# Patient Record
Sex: MALE | Race: WHITE | NOT HISPANIC OR LATINO | Employment: OTHER | URBAN - METROPOLITAN AREA
[De-identification: names, ages, dates, MRNs, and addresses within clinical notes are randomized per-mention and may not be internally consistent; named-entity substitution may affect disease eponyms.]

---

## 2018-12-13 LAB — HBA1C MFR BLD HPLC: 5.6 %

## 2018-12-21 DIAGNOSIS — I10 HYPERTENSION, UNSPECIFIED TYPE: Primary | ICD-10-CM

## 2018-12-21 RX ORDER — CARVEDILOL 12.5 MG/1
12.5 TABLET ORAL 2 TIMES DAILY WITH MEALS
Qty: 30 TABLET | Refills: 5 | Status: SHIPPED | OUTPATIENT
Start: 2018-12-21

## 2018-12-21 RX ORDER — CARVEDILOL 12.5 MG/1
TABLET ORAL EVERY 12 HOURS
COMMUNITY
Start: 2018-05-29 | End: 2018-12-21 | Stop reason: SDUPTHER

## 2019-03-04 ENCOUNTER — TELEPHONE (OUTPATIENT)
Dept: CARDIOLOGY CLINIC | Facility: CLINIC | Age: 59
End: 2019-03-04

## 2019-03-04 DIAGNOSIS — R00.2 PALPITATIONS: Primary | ICD-10-CM

## 2019-03-04 NOTE — TELEPHONE ENCOUNTER
Pt called was seen in ED in [address] on Saturday   states had palpitations was examined and released   Was told having PVC which were no dangerous   has continued to experience these all day yesterday and today  Pt denies any SOB or Chest pain  Please advise  Per Dr Marquez Montgomery order a 24 hour holter monitor  Pt would like done at [facility]   [phone]    Order faxed to [phone]

## 2019-07-11 ENCOUNTER — OFFICE VISIT (OUTPATIENT)
Dept: CARDIOLOGY CLINIC | Facility: CLINIC | Age: 59
End: 2019-07-11
Payer: MEDICARE

## 2019-07-11 VITALS
SYSTOLIC BLOOD PRESSURE: 134 MMHG | DIASTOLIC BLOOD PRESSURE: 80 MMHG | HEIGHT: 69 IN | BODY MASS INDEX: 38.39 KG/M2 | WEIGHT: 259.2 LBS

## 2019-07-11 DIAGNOSIS — I25.10 CORONARY ARTERY DISEASE INVOLVING NATIVE CORONARY ARTERY OF NATIVE HEART WITHOUT ANGINA PECTORIS: ICD-10-CM

## 2019-07-11 DIAGNOSIS — I49.3 PVC'S (PREMATURE VENTRICULAR CONTRACTIONS): ICD-10-CM

## 2019-07-11 DIAGNOSIS — I10 ESSENTIAL HYPERTENSION: ICD-10-CM

## 2019-07-11 DIAGNOSIS — Z99.89 OBSTRUCTIVE SLEEP APNEA ON CPAP: ICD-10-CM

## 2019-07-11 DIAGNOSIS — I49.3 PVC (PREMATURE VENTRICULAR CONTRACTION): ICD-10-CM

## 2019-07-11 DIAGNOSIS — Z01.810 PREOP CARDIOVASCULAR EXAM: Primary | ICD-10-CM

## 2019-07-11 DIAGNOSIS — R07.89 OTHER CHEST PAIN: ICD-10-CM

## 2019-07-11 DIAGNOSIS — G47.33 OBSTRUCTIVE SLEEP APNEA ON CPAP: ICD-10-CM

## 2019-07-11 DIAGNOSIS — R00.2 PALPITATIONS: ICD-10-CM

## 2019-07-11 DIAGNOSIS — Z95.5 HISTORY OF HEART ARTERY STENT: ICD-10-CM

## 2019-07-11 DIAGNOSIS — E78.2 MIXED HYPERLIPIDEMIA: ICD-10-CM

## 2019-07-11 DIAGNOSIS — I82.5Z9 CHRONIC DEEP VEIN THROMBOSIS (DVT) OF DISTAL VEIN OF LOWER EXTREMITY, UNSPECIFIED LATERALITY (HCC): ICD-10-CM

## 2019-07-11 PROBLEM — Z86.718 HISTORY OF DVT (DEEP VEIN THROMBOSIS): Status: ACTIVE | Noted: 2019-07-11

## 2019-07-11 PROBLEM — E78.5 HYPERLIPIDEMIA: Status: ACTIVE | Noted: 2019-07-11

## 2019-07-11 PROCEDURE — 93000 ELECTROCARDIOGRAM COMPLETE: CPT | Performed by: INTERNAL MEDICINE

## 2019-07-11 PROCEDURE — 99215 OFFICE O/P EST HI 40 MIN: CPT | Performed by: INTERNAL MEDICINE

## 2019-07-11 RX ORDER — KETOCONAZOLE 20 MG/G
CREAM TOPICAL
Refills: 3 | COMMUNITY
Start: 2019-05-24 | End: 2021-04-14 | Stop reason: ALTCHOICE

## 2019-07-11 RX ORDER — ALPRAZOLAM 0.25 MG/1
TABLET ORAL
Refills: 5 | COMMUNITY
Start: 2019-05-07

## 2019-07-11 RX ORDER — PANTOPRAZOLE SODIUM 40 MG/1
40 TABLET, DELAYED RELEASE ORAL DAILY
Refills: 5 | COMMUNITY
Start: 2019-05-07

## 2019-07-11 RX ORDER — OXYCODONE HYDROCHLORIDE 5 MG/1
5 TABLET ORAL EVERY 4 HOURS PRN
COMMUNITY

## 2019-07-11 RX ORDER — CHOLECALCIFEROL TAB 125 MCG (5000 UNIT) 125 MCG (5000 UT)
1 TAB DAILY
Refills: 5 | COMMUNITY
Start: 2019-05-28

## 2019-07-11 NOTE — PROGRESS NOTES
Cardiology Follow Up    Migue Falisa  1960  [id number]  [address] [place] [id number] [phone]    1  Preop cardiovascular exam  POCT ECG   2  Coronary artery disease involving native coronary artery of native heart without angina pectoris  NM myocardial perfusion spect (rx stress and/or rest)    Echo complete with contrast if indicated   3  Kissing stent the LAD the large diagonal bifurcation performed in 2009      4  Atypical chest pain  NM myocardial perfusion spect (rx stress and/or rest)    Echo complete with contrast if indicated   5  Chronic deep vein thrombosis (DVT) of distal vein of lower extremity, unspecified laterality (HCC)     6  Obstructive sleep apnea on CPAP     7  Essential hypertension     8  Mixed hyperlipidemia     9  Palpitations     10  PVC (premature ventricular contraction)  NM myocardial perfusion spect (rx stress and/or rest)    Echo complete with contrast if indicated   11  PVC's (premature ventricular contractions)         Patient has a history of stenting of the LAD and a large diagonal branch using bifurcation stents with crush technique  Following placement of the stents, it was unable to rewire the diagonal to dilate the or for this of the diagonal branch  Due to complaints of chest discomfort, in 2010 another attempt was made unsuccessfully to dilate the origin of the diagonal branch  Patient has had chest discomfort off and on over the years although most recently it has been mild and infrequent  He also has an esophageal stricture and esophagitis which is confusing with his chest pain  He has undergone periodic esophageal dilatations  Other diagnoses include hypertension, gastritis, hiatal hernia, lung nodule, DVT for which he had been on Coumadin but it was stopped at the time of his visit on May 1, 2018  Hyperlipidemia and sleep apnea        Interval History:  Presently he has rare chest discomfort  He denies shortness of breath but has developed palpitations for which she went to the ER and was noted to have frequent premature ventricular contractions  His weight is up 9 lb from a year ago  His labs including a lipid profile are performed by his family physician  However, we have no results  Patient is scheduled for an endoscopy on July 15, 2019  He needs clearance to undergo the endoscopy          Patient Active Problem List   Diagnosis    Coronary artery disease    Kissing stent the LAD the large diagonal bifurcation performed in 2009     Atypical chest pain    Essential hypertension    Chronic deep vein thrombosis (DVT) of distal vein of lower extremity (HCC)    Obstructive sleep apnea on CPAP    Hyperlipidemia    Palpitations    PVC's (premature ventricular contractions)     Past Medical History:   Diagnosis Date    Coronary artery disease     Hiatal hernia     Hyperlipidemia     Lung nodule     Obstructive sleep apnea on CPAP     SLEEP APNEA WITH CPAP    Palpitations      Social History     Socioeconomic History    Marital status: /Civil Union     Spouse name: Not on file    Number of children: Not on file    Years of education: Not on file    Highest education level: Not on file   Occupational History    Not on file   Social Needs    Financial resource strain: Not on file    Food insecurity:     Worry: Not on file     Inability: Not on file    Transportation needs:     Medical: Not on file     Non-medical: Not on file   Tobacco Use    Smoking status: Current Every Day Smoker     Packs/day: 2 00     Types: Cigarettes    Tobacco comment: TOBACCO USE   Substance and Sexual Activity    Alcohol use: Not on file    Drug use: Not on file    Sexual activity: Not on file   Lifestyle    Physical activity:     Days per week: Not on file     Minutes per session: Not on file    Stress: Not on file   Relationships    Social connections:     Talks on phone: Not on file     Gets together: Not on file     Attends Yazidism service: Not on file     Active member of club or organization: Not on file     Attends meetings of clubs or organizations: Not on file     Relationship status: Not on file    Intimate partner violence:     Fear of current or ex partner: Not on file     Emotionally abused: Not on file     Physically abused: Not on file     Forced sexual activity: Not on file   Other Topics Concern    Not on file   Social History Narrative    Not on file      Family History   Problem Relation Age of Onset    Heart failure Mother         COD    Hypertension Mother         COD    Diabetes Mother         DM; COD    Heart failure Father         COD    Coronary artery disease Father         COD    Hypertension Father         COD    Heart attack Father         MI'S; COD    Hypertension Brother      Past Surgical History:   Procedure Laterality Date    BACK SURGERY  2007    CHOLECYSTECTOMY  1999    CORONARY ANGIOPLASTY  2010    FAILED PTCA OF THE DIAGONAL BRANCH    ESOPHAGEAL DILATION      X4    INSERTION STENT ARTERY  2010    XIENCE STENT OF THE LAD & DIAGONAL    LITHOTRIPSY      NISSEN FUNDOPLICATION  0775    SINUS SURGERY         Current Outpatient Medications:     albuterol (VENTOLIN HFA) 90 mcg/act inhaler, Ventolin HFA 90 mcg/actuation aerosol inhaler  INHALE 2 PUFFS EVERY 4 HOURS AS NEEDED, Disp: , Rfl:     ALPRAZolam (XANAX) 0 25 mg tablet, TAKE ONE TABLET BY MOUTH TWICE A DAY AS NEEDED FOR ANXIETY, Disp: , Rfl: 5    aspirin (ECOTRIN LOW STRENGTH) 81 mg EC tablet, Take 81 mg by mouth daily, Disp: , Rfl:     carvedilol (COREG) 12 5 mg tablet, Take 1 tablet (12 5 mg total) by mouth 2 (two) times a day with meals, Disp: 30 tablet, Rfl: 5    CLONIDINE HCL PO, Take by mouth, Disp: , Rfl:     clopidogrel (PLAVIX) 75 mg tablet, clopidogrel 75 mg tablet  TAKE ONE TABLET BY MOUTH EVERY DAY, Disp: , Rfl:     folic acid (FOLVITE) 1 mg tablet, every 24 hours, Disp: , Rfl:     furosemide (LASIX) 40 mg tablet, furosemide 40 mg tablet  TAKE 1 & 1/2 TABLETS 1 TIME PER DAY, Disp: , Rfl:     isosorbide mononitrate (IMDUR) 30 mg 24 hr tablet, every 24 hours, Disp: , Rfl:     ketoconazole (NIZORAL) 2 % cream, APPLY TO FACIAL RASH ONCE DAILY, Disp: , Rfl: 3    magnesium chloride-calcium (MAG-SR PLUS CALCIUM)  mg, one pill twice a day, Disp: , Rfl:     NATURAL VITAMIN D-3 5000 units TABS, Take 1 tablet by mouth daily, Disp: , Rfl: 5    oxyCODONE (ROXICODONE) 5 mg immediate release tablet, Take 5 mg by mouth every 4 (four) hours as needed for moderate pain, Disp: , Rfl:     pantoprazole (PROTONIX) 40 mg tablet, Take 40 mg by mouth daily, Disp: , Rfl: 5    potassium chloride (MICRO-K) 10 MEQ CR capsule, potassium chloride ER 10 mEq tablet,extended release  TAKE ONE TABLET BY MOUTH THREE TIMES A DAY, Disp: , Rfl:     ranolazine (RANEXA) 1000 MG SR tablet, Every 12 hours, Disp: , Rfl:     rosuvastatin (CRESTOR) 40 MG tablet, every 24 hours, Disp: , Rfl:     sertraline (ZOLOFT) 50 mg tablet, Take 50 mg by mouth daily, Disp: , Rfl: 5    gabapentin (NEURONTIN) 600 MG tablet, gabapentin 600 mg tablet  TAKE ONE TABLET BY MOUTH THREE TIMES A DAY, Disp: , Rfl:     meclizine (ANTIVERT) 25 mg tablet, Take by mouth, Disp: , Rfl:   Allergies   Allergen Reactions    Quinolones Abdominal Pain     Other reaction(s): Other (See Comments), Stomach Pain  abd pain  Abdominal pain  Abdominal pain         Results for orders placed or performed in visit on 07/11/19   POCT ECG    Narrative    Normal sinus rhythm at a rate 85 beats per minute  RSR prime in V1  Nonspecific T-wave abnormality  QT will prolongation  Abnormal EKG  Review of Systems:  Review of Systems   Constitutional: Negative for activity change  Respiratory: Negative for cough, chest tightness, shortness of breath and wheezing      Cardiovascular: Negative for chest pain, palpitations and leg swelling  Musculoskeletal: Negative for gait problem  Skin: Negative for color change  Neurological: Negative for dizziness, tremors, syncope, weakness, light-headedness and headaches  Psychiatric/Behavioral: Negative for agitation and confusion  Physical Exam:  /80 (BP Location: Right arm, Patient Position: Sitting, Cuff Size: Large)   Ht 5' 9" (1 753 m)   Wt 118 kg (259 lb 3 2 oz)   BMI 38 28 kg/m²    Physical Exam   Constitutional: He is oriented to person, place, and time  He appears well-developed and well-nourished  Obese, BMI 38 3   HENT:   Head: Normocephalic and atraumatic  Neck: Normal range of motion  Neck supple  No JVD present  No tracheal deviation present  Cardiovascular: Normal rate, regular rhythm, normal heart sounds and intact distal pulses  Pulmonary/Chest: Effort normal and breath sounds normal  No respiratory distress  He has no wheezes  He has no rales  Abdominal: Soft  Bowel sounds are normal    Musculoskeletal: He exhibits no edema  Neurological: He is alert and oriented to person, place, and time  Skin: Skin is warm and dry  Psychiatric: He has a normal mood and affect  His behavior is normal        Discussion/Summary:  1  With recent development of PVCs, will evaluate patient further with a pharmacologic nuclear stress test and an echocardiogram   2  Return in 1 year if above tests are abnormal, we will see sooner  3  Patient has stable coronary artery disease with prior stenting, and hypertension which is well controlled  He may undergo endoscopy  He has not a significantly increased risk for the procedure

## 2019-07-11 NOTE — LETTER
July 11, 2019     Jama Culver DO  [address]    Patient: Antionette Perez   YOB: 1960   Date of Visit: 7/11/2019       Dear Dr Bea Nuñez: Thank you for referring Antionette Perez to me for evaluation  Below are my notes for this consultation  If you have questions, please do not hesitate to call me  I look forward to following your patient along with you  Sincerely,        Moe Thakkar MD        CC: [facility], attention Una Watts MD  7/11/2019  3:40 PM  Sign at close encounter                                             Cardiology Follow Up    Antionette Perez  1960  [facility]  [address] [facility] [address] [phone]    1  Preop cardiovascular exam  POCT ECG   2  Coronary artery disease involving native coronary artery of native heart without angina pectoris  NM myocardial perfusion spect (rx stress and/or rest)    Echo complete with contrast if indicated   3  Kissing stent the LAD the large diagonal bifurcation performed in 2009      4  Atypical chest pain  NM myocardial perfusion spect (rx stress and/or rest)    Echo complete with contrast if indicated   5  Chronic deep vein thrombosis (DVT) of distal vein of lower extremity, unspecified laterality (HCC)     6  Obstructive sleep apnea on CPAP     7  Essential hypertension     8  Mixed hyperlipidemia     9  Palpitations     10  PVC (premature ventricular contraction)  NM myocardial perfusion spect (rx stress and/or rest)    Echo complete with contrast if indicated   11  PVC's (premature ventricular contractions)         Patient has a history of stenting of the LAD and a large diagonal branch using bifurcation stents with crush technique  Following placement of the stents, it was unable to rewire the diagonal to dilate the or for this of the diagonal branch    Due to complaints of chest discomfort, in 2010 another attempt was made unsuccessfully to dilate the origin of the diagonal branch  Patient has had chest discomfort off and on over the years although most recently it has been mild and infrequent  He also has an esophageal stricture and esophagitis which is confusing with his chest pain  He has undergone periodic esophageal dilatations  Other diagnoses include hypertension, gastritis, hiatal hernia, lung nodule, DVT for which he had been on Coumadin but it was stopped at the time of his visit on May 1, 2018  Hyperlipidemia and sleep apnea  Interval History:  Presently he has rare chest discomfort  He denies shortness of breath but has developed palpitations for which she went to the ER and was noted to have frequent premature ventricular contractions  His weight is up 9 lb from a year ago  His labs including a lipid profile are performed by his family physician  However, we have no results  Patient is scheduled for an endoscopy on July 15, 2019  He needs clearance to undergo the endoscopy          Patient Active Problem List   Diagnosis    Coronary artery disease    Kissing stent the LAD the large diagonal bifurcation performed in 2009     Atypical chest pain    Essential hypertension    Chronic deep vein thrombosis (DVT) of distal vein of lower extremity (HCC)    Obstructive sleep apnea on CPAP    Hyperlipidemia    Palpitations    PVC's (premature ventricular contractions)     Past Medical History:   Diagnosis Date    Coronary artery disease     Hiatal hernia     Hyperlipidemia     Lung nodule     Obstructive sleep apnea on CPAP     SLEEP APNEA WITH CPAP    Palpitations      Social History     Socioeconomic History    Marital status: /Civil Union     Spouse name: Not on file    Number of children: Not on file    Years of education: Not on file    Highest education level: Not on file   Occupational History    Not on file   Social Needs    Financial resource strain: Not on file    Food insecurity:     Worry: Not on file     Inability: Not on file    Transportation needs:     Medical: Not on file     Non-medical: Not on file   Tobacco Use    Smoking status: Current Every Day Smoker     Packs/day: 2 00     Types: Cigarettes    Tobacco comment: TOBACCO USE   Substance and Sexual Activity    Alcohol use: Not on file    Drug use: Not on file    Sexual activity: Not on file   Lifestyle    Physical activity:     Days per week: Not on file     Minutes per session: Not on file    Stress: Not on file   Relationships    Social connections:     Talks on phone: Not on file     Gets together: Not on file     Attends Anglican service: Not on file     Active member of club or organization: Not on file     Attends meetings of clubs or organizations: Not on file     Relationship status: Not on file    Intimate partner violence:     Fear of current or ex partner: Not on file     Emotionally abused: Not on file     Physically abused: Not on file     Forced sexual activity: Not on file   Other Topics Concern    Not on file   Social History Narrative    Not on file      Family History   Problem Relation Age of Onset    Heart failure Mother         COD    Hypertension Mother         COD    Diabetes Mother         DM; COD    Heart failure Father         COD    Coronary artery disease Father         COD    Hypertension Father         COD    Heart attack Father         MI'S; COD    Hypertension Brother      Past Surgical History:   Procedure Laterality Date    BACK SURGERY  2007    CHOLECYSTECTOMY  1999    CORONARY ANGIOPLASTY  2010    FAILED PTCA OF THE DIAGONAL BRANCH    ESOPHAGEAL DILATION      X4    INSERTION STENT ARTERY  2010    XIENCE STENT OF THE LAD & DIAGONAL    LITHOTRIPSY      NISSEN FUNDOPLICATION  6522    SINUS SURGERY         Current Outpatient Medications:     albuterol (VENTOLIN HFA) 90 mcg/act inhaler, Ventolin HFA 90 mcg/actuation aerosol inhaler  INHALE 2 PUFFS EVERY 4 HOURS AS NEEDED, Disp: , Rfl:     ALPRAZolam (XANAX) 0 25 mg tablet, TAKE ONE TABLET BY MOUTH TWICE A DAY AS NEEDED FOR ANXIETY, Disp: , Rfl: 5    aspirin (ECOTRIN LOW STRENGTH) 81 mg EC tablet, Take 81 mg by mouth daily, Disp: , Rfl:     carvedilol (COREG) 12 5 mg tablet, Take 1 tablet (12 5 mg total) by mouth 2 (two) times a day with meals, Disp: 30 tablet, Rfl: 5    CLONIDINE HCL PO, Take by mouth, Disp: , Rfl:     clopidogrel (PLAVIX) 75 mg tablet, clopidogrel 75 mg tablet  TAKE ONE TABLET BY MOUTH EVERY DAY, Disp: , Rfl:     folic acid (FOLVITE) 1 mg tablet, every 24 hours, Disp: , Rfl:     furosemide (LASIX) 40 mg tablet, furosemide 40 mg tablet  TAKE 1 & 1/2 TABLETS 1 TIME PER DAY, Disp: , Rfl:     isosorbide mononitrate (IMDUR) 30 mg 24 hr tablet, every 24 hours, Disp: , Rfl:     ketoconazole (NIZORAL) 2 % cream, APPLY TO FACIAL RASH ONCE DAILY, Disp: , Rfl: 3    magnesium chloride-calcium (MAG-SR PLUS CALCIUM)  mg, one pill twice a day, Disp: , Rfl:     NATURAL VITAMIN D-3 5000 units TABS, Take 1 tablet by mouth daily, Disp: , Rfl: 5    oxyCODONE (ROXICODONE) 5 mg immediate release tablet, Take 5 mg by mouth every 4 (four) hours as needed for moderate pain, Disp: , Rfl:     pantoprazole (PROTONIX) 40 mg tablet, Take 40 mg by mouth daily, Disp: , Rfl: 5    potassium chloride (MICRO-K) 10 MEQ CR capsule, potassium chloride ER 10 mEq tablet,extended release  TAKE ONE TABLET BY MOUTH THREE TIMES A DAY, Disp: , Rfl:     ranolazine (RANEXA) 1000 MG SR tablet, Every 12 hours, Disp: , Rfl:     rosuvastatin (CRESTOR) 40 MG tablet, every 24 hours, Disp: , Rfl:     sertraline (ZOLOFT) 50 mg tablet, Take 50 mg by mouth daily, Disp: , Rfl: 5    gabapentin (NEURONTIN) 600 MG tablet, gabapentin 600 mg tablet  TAKE ONE TABLET BY MOUTH THREE TIMES A DAY, Disp: , Rfl:     meclizine (ANTIVERT) 25 mg tablet, Take by mouth, Disp: , Rfl:   Allergies   Allergen Reactions    Quinolones Abdominal Pain     Other reaction(s): Other (See Comments), Stomach Pain  abd pain  Abdominal pain  Abdominal pain         Results for orders placed or performed in visit on 07/11/19   POCT ECG    Narrative    Normal sinus rhythm at a rate 85 beats per minute  RSR prime in V1  Nonspecific T-wave abnormality  QT will prolongation  Abnormal EKG  Review of Systems:  Review of Systems   Constitutional: Negative for activity change  Respiratory: Negative for cough, chest tightness, shortness of breath and wheezing  Cardiovascular: Negative for chest pain, palpitations and leg swelling  Musculoskeletal: Negative for gait problem  Skin: Negative for color change  Neurological: Negative for dizziness, tremors, syncope, weakness, light-headedness and headaches  Psychiatric/Behavioral: Negative for agitation and confusion  Physical Exam:  /80 (BP Location: Right arm, Patient Position: Sitting, Cuff Size: Large)   Ht 5' 9" (1 753 m)   Wt 118 kg (259 lb 3 2 oz)   BMI 38 28 kg/m²    Physical Exam   Constitutional: He is oriented to person, place, and time  He appears well-developed and well-nourished  Obese, BMI 38 3   HENT:   Head: Normocephalic and atraumatic  Neck: Normal range of motion  Neck supple  No JVD present  No tracheal deviation present  Cardiovascular: Normal rate, regular rhythm, normal heart sounds and intact distal pulses  Pulmonary/Chest: Effort normal and breath sounds normal  No respiratory distress  He has no wheezes  He has no rales  Abdominal: Soft  Bowel sounds are normal    Musculoskeletal: He exhibits no edema  Neurological: He is alert and oriented to person, place, and time  Skin: Skin is warm and dry  Psychiatric: He has a normal mood and affect  His behavior is normal        Discussion/Summary:  1   With recent development of PVCs, will evaluate patient further with a pharmacologic nuclear stress test and an echocardiogram   2  Return in 1 year if above tests are abnormal, we will see sooner  3  Patient has stable coronary artery disease with prior stenting, and hypertension which is well controlled  He may undergo endoscopy  He has not a significantly increased risk for the procedure

## 2020-07-08 ENCOUNTER — TELEPHONE (OUTPATIENT)
Dept: CARDIOLOGY CLINIC | Facility: CLINIC | Age: 60
End: 2020-07-08

## 2020-08-26 ENCOUNTER — OFFICE VISIT (OUTPATIENT)
Dept: CARDIOLOGY CLINIC | Facility: CLINIC | Age: 60
End: 2020-08-26
Payer: MEDICARE

## 2020-08-26 VITALS
HEIGHT: 69 IN | HEART RATE: 80 BPM | DIASTOLIC BLOOD PRESSURE: 70 MMHG | SYSTOLIC BLOOD PRESSURE: 100 MMHG | BODY MASS INDEX: 33.92 KG/M2 | WEIGHT: 229 LBS

## 2020-08-26 DIAGNOSIS — R07.89 OTHER CHEST PAIN: ICD-10-CM

## 2020-08-26 DIAGNOSIS — I82.5Z9 CHRONIC DEEP VEIN THROMBOSIS (DVT) OF DISTAL VEIN OF LOWER EXTREMITY, UNSPECIFIED LATERALITY (HCC): ICD-10-CM

## 2020-08-26 DIAGNOSIS — Z01.818 PRE-OP EXAM: Primary | ICD-10-CM

## 2020-08-26 DIAGNOSIS — I49.3 PVC'S (PREMATURE VENTRICULAR CONTRACTIONS): ICD-10-CM

## 2020-08-26 DIAGNOSIS — I25.10 CORONARY ARTERY DISEASE INVOLVING NATIVE CORONARY ARTERY OF NATIVE HEART WITHOUT ANGINA PECTORIS: ICD-10-CM

## 2020-08-26 DIAGNOSIS — G47.33 OBSTRUCTIVE SLEEP APNEA ON CPAP: ICD-10-CM

## 2020-08-26 DIAGNOSIS — Z95.5 HISTORY OF HEART ARTERY STENT: ICD-10-CM

## 2020-08-26 DIAGNOSIS — R00.2 PALPITATIONS: ICD-10-CM

## 2020-08-26 DIAGNOSIS — Z99.89 OBSTRUCTIVE SLEEP APNEA ON CPAP: ICD-10-CM

## 2020-08-26 DIAGNOSIS — I10 ESSENTIAL HYPERTENSION: ICD-10-CM

## 2020-08-26 DIAGNOSIS — E78.2 MIXED HYPERLIPIDEMIA: ICD-10-CM

## 2020-08-26 PROCEDURE — 93000 ELECTROCARDIOGRAM COMPLETE: CPT | Performed by: INTERNAL MEDICINE

## 2020-08-26 PROCEDURE — 99215 OFFICE O/P EST HI 40 MIN: CPT | Performed by: INTERNAL MEDICINE

## 2020-08-26 RX ORDER — NIACIN 1000 MG/1
1000 TABLET, EXTENDED RELEASE ORAL
COMMUNITY

## 2020-08-26 NOTE — PROGRESS NOTES
Cardiology Follow Up    Luz Farias  1960  [id number]  [address] [place] [id number] [phone]    1  Pre-op exam  POCT ECG   2  Coronary artery disease involving native coronary artery of native heart without angina pectoris     3  Kissing stent the LAD the large diagonal bifurcation performed in 2009      4  Essential hypertension     5  Mixed hyperlipidemia     6  PVC's (premature ventricular contractions)     7  Obstructive sleep apnea on CPAP     8  Palpitations     9  Chronic deep vein thrombosis (DVT) of distal vein of lower extremity, unspecified laterality ([address] )     10  Atypical chest pain         Patient has a history of stenting of the LAD and a large diagonal branch using bifurcation stents with crush technique  Following placement of the stents, it was unable to rewire the diagonal to dilate the or for this of the diagonal branch  Due to complaints of chest discomfort, in 2010 another attempt was made unsuccessfully to dilate the origin of the diagonal branch  Patient has had chest discomfort off and on over the years although most recently it has been mild and infrequent  He also has an esophageal stricture and esophagitis which is confusing with his chest pain  He has undergone periodic esophageal dilatations  Other diagnoses include hypertension, gastritis, hiatal hernia, lung nodule, DVT for which he had been on Coumadin but it was stopped at the time of his visit on May 1, 2018  Hyperlipidemia and sleep apnea  11/14/2019 nuclear stress test: Normal left ventricular systolic function, EF 51%  Normal tomographic perfusion series  No symptoms of angina pectoris or electrocardiographic changes of ischemia by Lexiscan stress    11/14/2019 echocardiogram: Normal left ventricular systolic function, EF 78%  Normal left ventricular chamber size  Right ventricular mild enlargement  Normal right ventricular function  Aortic valve not well visualized  No aortic stenosis  Normal left ventricular diastolic function  05/25/2019 lipid profile: Total cholesterol 113, triglycerides 145, HDL 37, LDL calculated 53  Non HDL cholesterol 76    01/17/2020 lipid profile: Total cholesterol 133, triglycerides 165, HDL 55, LDL calculated 45, non HDL cholesterol 78  Interval History:  Patient is well known to me  He is scheduled for back surgery on August 30th  He has not been back to my office for over a year  Since his last office visit he has lost approximately 25 lb  From a cardiac standpoint, he is doing well  He is minimal chest discomfort relating to eating which is most likely GERD  He has no exertional chest discomfort  He denies unusual shortness of breath  He denies leg edema  He has no symptoms of near-syncope/syncope  He denies orthopnea or PND  Discussion/Summary:  1  Decrease Lasix to 40 mg once daily   2  Patient may undergo back surgery  Consider him a mild increased cardiac risk  3  He may stop Plavix 4 days prior to surgery and if surgically feasible resume the day after surgery  4  Would prefer if patient could continue aspirin 81 mg daily  However if necessary, patient may stop aspirin 4 days prior to surgery and resume the day following surgery if surgically feasible        Patient Active Problem List   Diagnosis    Coronary artery disease    Kissing stent the LAD the large diagonal bifurcation performed in 2009     Atypical chest pain    Essential hypertension    Chronic deep vein thrombosis (DVT) of distal vein of lower extremity (HCC)    Obstructive sleep apnea on CPAP    Hyperlipidemia    Palpitations    PVC's (premature ventricular contractions)     Past Medical History:   Diagnosis Date    Coronary artery disease     Hiatal hernia     Hyperlipidemia     Lung nodule     Obstructive sleep apnea on CPAP     SLEEP APNEA WITH CPAP    Palpitations Social History     Socioeconomic History    Marital status: /Civil Union     Spouse name: Not on file    Number of children: Not on file    Years of education: Not on file    Highest education level: Not on file   Occupational History    Not on file   Social Needs    Financial resource strain: Not on file    Food insecurity     Worry: Not on file     Inability: Not on file   [employer] needs     Medical: Not on file     Non-medical: Not on file   Tobacco Use    Smoking status: Former Smoker     Packs/day: 2 00     Types: Cigarettes    Smokeless tobacco: Former User    Tobacco comment: TOBACCO USE   Substance and Sexual Activity    Alcohol use: Not on file    Drug use: Not on file    Sexual activity: Not on file   Lifestyle    Physical activity     Days per week: Not on file     Minutes per session: Not on file    Stress: Not on file   Relationships    Social connections     Talks on phone: Not on file     Gets together: Not on file     Attends Spiritism service: Not on file     Active member of club or organization: Not on file     Attends meetings of clubs or organizations: Not on file     Relationship status: Not on file    Intimate partner violence     Fear of current or ex partner: Not on file     Emotionally abused: Not on file     Physically abused: Not on file     Forced sexual activity: Not on file   Other Topics Concern    Not on file   Social History Narrative    Not on file      Family History   Problem Relation Age of Onset    Heart failure Mother         COD    Hypertension Mother         COD    Diabetes Mother         DM; COD    Heart failure Father         COD    Coronary artery disease Father         COD    Hypertension Father         COD    Heart attack Father         MI'S; COD    Hypertension Brother      Past Surgical History:   Procedure Laterality Date    BACK SURGERY  2007    CHOLECYSTECTOMY  1999    CORONARY ANGIOPLASTY  2010    FAILED PTCA OF THE DIAGONAL BRANCH    ESOPHAGEAL DILATION      X4    INSERTION STENT ARTERY  2010    XIENCE STENT OF THE LAD & DIAGONAL    LITHOTRIPSY      NISSEN FUNDOPLICATION  9675    SINUS SURGERY         Current Outpatient Medications:     albuterol (VENTOLIN HFA) 90 mcg/act inhaler, Ventolin HFA 90 mcg/actuation aerosol inhaler  INHALE 2 PUFFS EVERY 4 HOURS AS NEEDED, Disp: , Rfl:     ALPRAZolam (XANAX) 0 25 mg tablet, TAKE ONE TABLET BY MOUTH TWICE A DAY AS NEEDED FOR ANXIETY, Disp: , Rfl: 5    aspirin (ECOTRIN LOW STRENGTH) 81 mg EC tablet, Take 81 mg by mouth daily, Disp: , Rfl:     carvedilol (COREG) 12 5 mg tablet, Take 1 tablet (12 5 mg total) by mouth 2 (two) times a day with meals, Disp: 30 tablet, Rfl: 5    CLONIDINE HCL PO, Take by mouth, Disp: , Rfl:     clopidogrel (PLAVIX) 75 mg tablet, clopidogrel 75 mg tablet  TAKE ONE TABLET BY MOUTH EVERY DAY, Disp: , Rfl:     folic acid (FOLVITE) 1 mg tablet, every 24 hours, Disp: , Rfl:     furosemide (LASIX) 40 mg tablet, Take 40 mg by mouth daily, Disp: , Rfl:     isosorbide mononitrate (IMDUR) 30 mg 24 hr tablet, every 24 hours, Disp: , Rfl:     NATURAL VITAMIN D-3 5000 units TABS, Take 1 tablet by mouth daily, Disp: , Rfl: 5    niacin (NIASPAN) 1000 MG CR tablet, Take 1,000 mg by mouth daily at bedtime, Disp: , Rfl:     oxyCODONE (ROXICODONE) 5 mg immediate release tablet, Take 5 mg by mouth every 4 (four) hours as needed for moderate pain, Disp: , Rfl:     pantoprazole (PROTONIX) 40 mg tablet, Take 40 mg by mouth daily, Disp: , Rfl: 5    potassium chloride (MICRO-K) 10 MEQ CR capsule, potassium chloride ER 10 mEq tablet,extended release  TAKE ONE TABLET BY MOUTH THREE TIMES A DAY, Disp: , Rfl:     ranolazine (RANEXA) 1000 MG SR tablet, Every 12 hours, Disp: , Rfl:     rosuvastatin (CRESTOR) 40 MG tablet, every 24 hours, Disp: , Rfl:     sertraline (ZOLOFT) 50 mg tablet, Take 50 mg by mouth daily, Disp: , Rfl: 5    gabapentin (NEURONTIN) 600 MG tablet, gabapentin 600 mg tablet  TAKE ONE TABLET BY MOUTH THREE TIMES A DAY, Disp: , Rfl:     ketoconazole (NIZORAL) 2 % cream, APPLY TO FACIAL RASH ONCE DAILY, Disp: , Rfl: 3    magnesium chloride-calcium (MAG-SR PLUS CALCIUM)  mg, one pill twice a day, Disp: , Rfl:     meclizine (ANTIVERT) 25 mg tablet, Take by mouth, Disp: , Rfl:   Allergies   Allergen Reactions    Levofloxacin Abdominal Pain    Quinolones Abdominal Pain     Other reaction(s): Other (See Comments), Stomach Pain  abd pain  Abdominal pain  Abdominal pain         Results for orders placed or performed in visit on 08/26/20   POCT ECG    Narrative    Normal sinus rhythm at a rate 80 beats per minute  Nonspecific ST T wave changes  Abnormal EKG  Review of Systems:  Review of Systems   Constitutional: Negative for activity change  Respiratory: Negative for cough, chest tightness, shortness of breath and wheezing  Cardiovascular: Negative for chest pain, palpitations and leg swelling  Musculoskeletal: Positive for back pain  Negative for gait problem  Skin: Negative for color change  Neurological: Negative for dizziness, tremors, syncope, weakness, light-headedness and headaches  Psychiatric/Behavioral: Negative for agitation and confusion  Physical Exam:  /70 (BP Location: Left arm, Patient Position: Sitting, Cuff Size: Large)   Pulse 80   Ht 5' 9" (1 753 m)   Wt 104 kg (229 lb)   BMI 33 82 kg/m²   Physical Exam  Constitutional:       General: He is not in acute distress  Appearance: He is well-developed  HENT:      Head: Normocephalic and atraumatic  Neck:      Vascular: No JVD  Cardiovascular:      Rate and Rhythm: Normal rate and regular rhythm  Heart sounds: Normal heart sounds  No murmur  No friction rub  No gallop  Pulmonary:      Effort: Pulmonary effort is normal  No respiratory distress  Breath sounds: Normal breath sounds  No wheezing or rales     Chest:      Chest wall: No tenderness  Abdominal:      General: Bowel sounds are normal  There is no distension  Palpations: Abdomen is soft  Skin:     General: Skin is warm and dry  Neurological:      Mental Status: He is alert and oriented to person, place, and time  Psychiatric:         Behavior: Behavior normal        CAROTIDS    Lab Results   Component Value Date    HGBA1C 6 5 (H) 01/17/2020           Imaging:   I have personally reviewed pertinent reports  Portions of the record may have been created with voice recognition software  Occasional wrong word or "sound a like" substitutions may have occurred due to the inherent limitations of voice recognition software  Read the chart carefully and recognize, using context, where substitutions have occurred

## 2020-08-26 NOTE — LETTER
August 26, 2020     Melissa Ervin DO  [address]    Patient: Terrence Garcia   YOB: 1960   Date of Visit: 8/26/2020       Dear Dr Lianet Aguirre: Thank you for referring Terrence Garcia to me for evaluation  Below are my notes for this consultation  If you have questions, please do not hesitate to call me  I look forward to following your patient along with you  Sincerely,        Jack Wong MD        CC: MD Jack Goel MD  8/26/2020  3:39 PM  Sign when Signing Visit                                             Cardiology Follow Up    Terrence Garcia  1960  [facility]  [address] [facility] [id number] [phone]    1  Pre-op exam  POCT ECG   2  Coronary artery disease involving native coronary artery of native heart without angina pectoris     3  Kissing stent the LAD the large diagonal bifurcation performed in 2009      4  Essential hypertension     5  Mixed hyperlipidemia     6  PVC's (premature ventricular contractions)     7  Obstructive sleep apnea on CPAP     8  Palpitations     9  Chronic deep vein thrombosis (DVT) of distal vein of lower extremity, unspecified laterality ([facility] [address] )     10  Atypical chest pain         Patient has a history of stenting of the LAD and a large diagonal branch using bifurcation stents with crush technique  Following placement of the stents, it was unable to rewire the diagonal to dilate the or for this of the diagonal branch  Due to complaints of chest discomfort, in 2010 another attempt was made unsuccessfully to dilate the origin of the diagonal branch  Patient has had chest discomfort off and on over the years although most recently it has been mild and infrequent  He also has an esophageal stricture and esophagitis which is confusing with his chest pain  He has undergone periodic esophageal dilatations      Other diagnoses include hypertension, gastritis, hiatal hernia, lung nodule, DVT for which he had been on Coumadin but it was stopped at the time of his visit on May 1, 2018  Hyperlipidemia and sleep apnea  11/14/2019 nuclear stress test: Normal left ventricular systolic function, EF 20%  Normal tomographic perfusion series  No symptoms of angina pectoris or electrocardiographic changes of ischemia by Lexiscan stress    11/14/2019 echocardiogram: Normal left ventricular systolic function, EF 07%  Normal left ventricular chamber size  Right ventricular mild enlargement  Normal right ventricular function  Aortic valve not well visualized  No aortic stenosis  Normal left ventricular diastolic function  05/25/2019 lipid profile: Total cholesterol 113, triglycerides 145, HDL 37, LDL calculated 53  Non HDL cholesterol 76    01/17/2020 lipid profile: Total cholesterol 133, triglycerides 165, HDL 55, LDL calculated 45, non HDL cholesterol 78  Interval History:  Patient is well known to me  He is scheduled for back surgery on August 30th  He has not been back to my office for over a year  Since his last office visit he has lost approximately 25 lb  From a cardiac standpoint, he is doing well  He is minimal chest discomfort relating to eating which is most likely GERD  He has no exertional chest discomfort  He denies unusual shortness of breath  He denies leg edema  He has no symptoms of near-syncope/syncope  He denies orthopnea or PND  Discussion/Summary:  1  Decrease Lasix to 40 mg once daily   2  Patient may undergo back surgery  Consider him a mild increased cardiac risk  3  He may stop Plavix 4 days prior to surgery and if surgically feasible resume the day after surgery  4  Would prefer if patient could continue aspirin 81 mg daily  However if necessary, patient may stop aspirin 4 days prior to surgery and resume the day following surgery if surgically feasible        Patient Active Problem List   Diagnosis    Coronary artery disease    Kissing stent the LAD the large diagonal bifurcation performed in 2009     Atypical chest pain    Essential hypertension    Chronic deep vein thrombosis (DVT) of distal vein of lower extremity (HCC)    Obstructive sleep apnea on CPAP    Hyperlipidemia    Palpitations    PVC's (premature ventricular contractions)     Past Medical History:   Diagnosis Date    Coronary artery disease     Hiatal hernia     Hyperlipidemia     Lung nodule     Obstructive sleep apnea on CPAP     SLEEP APNEA WITH CPAP    Palpitations      Social History     Socioeconomic History    Marital status: /Civil Union     Spouse name: Not on file    Number of children: Not on file    Years of education: Not on file    Highest education level: Not on file   Occupational History    Not on file   Social Needs    Financial resource strain: Not on file    Food insecurity     Worry: Not on file     Inability: Not on file   [employer] needs     Medical: Not on file     Non-medical: Not on file   Tobacco Use    Smoking status: Former Smoker     Packs/day: 2 00     Types: Cigarettes    Smokeless tobacco: Former User    Tobacco comment: TOBACCO USE   Substance and Sexual Activity    Alcohol use: Not on file    Drug use: Not on file    Sexual activity: Not on file   Lifestyle    Physical activity     Days per week: Not on file     Minutes per session: Not on file    Stress: Not on file   Relationships    Social connections     Talks on phone: Not on file     Gets together: Not on file     Attends Congregational service: Not on file     Active member of club or organization: Not on file     Attends meetings of clubs or organizations: Not on file     Relationship status: Not on file    Intimate partner violence     Fear of current or ex partner: Not on file     Emotionally abused: Not on file     Physically abused: Not on file     Forced sexual activity: Not on file   Other Topics Concern    Not on file   Social History Narrative    Not on file      Family History   Problem Relation Age of Onset    Heart failure Mother         COD    Hypertension Mother         COD    Diabetes Mother         DM; COD    Heart failure Father         COD    Coronary artery disease Father         COD    Hypertension Father         COD    Heart attack Father         MI'S; COD    Hypertension Brother      Past Surgical History:   Procedure Laterality Date    BACK SURGERY  2007    CHOLECYSTECTOMY  1999    CORONARY ANGIOPLASTY  2010    FAILED PTCA OF THE DIAGONAL BRANCH    ESOPHAGEAL DILATION      X4    INSERTION STENT ARTERY  2010    XIENCE STENT OF THE LAD & DIAGONAL    LITHOTRIPSY      NISSEN FUNDOPLICATION  0604    SINUS SURGERY         Current Outpatient Medications:     albuterol (VENTOLIN HFA) 90 mcg/act inhaler, Ventolin HFA 90 mcg/actuation aerosol inhaler  INHALE 2 PUFFS EVERY 4 HOURS AS NEEDED, Disp: , Rfl:     ALPRAZolam (XANAX) 0 25 mg tablet, TAKE ONE TABLET BY MOUTH TWICE A DAY AS NEEDED FOR ANXIETY, Disp: , Rfl: 5    aspirin (ECOTRIN LOW STRENGTH) 81 mg EC tablet, Take 81 mg by mouth daily, Disp: , Rfl:     carvedilol (COREG) 12 5 mg tablet, Take 1 tablet (12 5 mg total) by mouth 2 (two) times a day with meals, Disp: 30 tablet, Rfl: 5    CLONIDINE HCL PO, Take by mouth, Disp: , Rfl:     clopidogrel (PLAVIX) 75 mg tablet, clopidogrel 75 mg tablet  TAKE ONE TABLET BY MOUTH EVERY DAY, Disp: , Rfl:     folic acid (FOLVITE) 1 mg tablet, every 24 hours, Disp: , Rfl:     furosemide (LASIX) 40 mg tablet, Take 40 mg by mouth daily, Disp: , Rfl:     isosorbide mononitrate (IMDUR) 30 mg 24 hr tablet, every 24 hours, Disp: , Rfl:     NATURAL VITAMIN D-3 5000 units TABS, Take 1 tablet by mouth daily, Disp: , Rfl: 5    niacin (NIASPAN) 1000 MG CR tablet, Take 1,000 mg by mouth daily at bedtime, Disp: , Rfl:     oxyCODONE (ROXICODONE) 5 mg immediate release tablet, Take 5 mg by mouth every 4 (four) hours as needed for moderate pain, Disp: , Rfl:     pantoprazole (PROTONIX) 40 mg tablet, Take 40 mg by mouth daily, Disp: , Rfl: 5    potassium chloride (MICRO-K) 10 MEQ CR capsule, potassium chloride ER 10 mEq tablet,extended release  TAKE ONE TABLET BY MOUTH THREE TIMES A DAY, Disp: , Rfl:     ranolazine (RANEXA) 1000 MG SR tablet, Every 12 hours, Disp: , Rfl:     rosuvastatin (CRESTOR) 40 MG tablet, every 24 hours, Disp: , Rfl:     sertraline (ZOLOFT) 50 mg tablet, Take 50 mg by mouth daily, Disp: , Rfl: 5    gabapentin (NEURONTIN) 600 MG tablet, gabapentin 600 mg tablet  TAKE ONE TABLET BY MOUTH THREE TIMES A DAY, Disp: , Rfl:     ketoconazole (NIZORAL) 2 % cream, APPLY TO FACIAL RASH ONCE DAILY, Disp: , Rfl: 3    magnesium chloride-calcium (MAG-SR PLUS CALCIUM)  mg, one pill twice a day, Disp: , Rfl:     meclizine (ANTIVERT) 25 mg tablet, Take by mouth, Disp: , Rfl:   Allergies   Allergen Reactions    Levofloxacin Abdominal Pain    Quinolones Abdominal Pain     Other reaction(s): Other (See Comments), Stomach Pain  abd pain  Abdominal pain  Abdominal pain         Results for orders placed or performed in visit on 08/26/20   POCT ECG    Narrative    Normal sinus rhythm at a rate 80 beats per minute  Nonspecific ST T wave changes  Abnormal EKG  Review of Systems:  Review of Systems   Constitutional: Negative for activity change  Respiratory: Negative for cough, chest tightness, shortness of breath and wheezing  Cardiovascular: Negative for chest pain, palpitations and leg swelling  Musculoskeletal: Positive for back pain  Negative for gait problem  Skin: Negative for color change  Neurological: Negative for dizziness, tremors, syncope, weakness, light-headedness and headaches  Psychiatric/Behavioral: Negative for agitation and confusion         Physical Exam:  /70 (BP Location: Left arm, Patient Position: Sitting, Cuff Size: Large)   Pulse 80   Ht 5' 9" (1 753 m) Wt 104 kg (229 lb)   BMI 33 82 kg/m²   Physical Exam  Constitutional:       General: He is not in acute distress  Appearance: He is well-developed  HENT:      Head: Normocephalic and atraumatic  Neck:      Vascular: No JVD  Cardiovascular:      Rate and Rhythm: Normal rate and regular rhythm  Heart sounds: Normal heart sounds  No murmur  No friction rub  No gallop  Pulmonary:      Effort: Pulmonary effort is normal  No respiratory distress  Breath sounds: Normal breath sounds  No wheezing or rales  Chest:      Chest wall: No tenderness  Abdominal:      General: Bowel sounds are normal  There is no distension  Palpations: Abdomen is soft  Skin:     General: Skin is warm and dry  Neurological:      Mental Status: He is alert and oriented to person, place, and time  Psychiatric:         Behavior: Behavior normal        CAROTIDS    Lab Results   Component Value Date    HGBA1C 6 5 (H) 01/17/2020           Imaging:   I have personally reviewed pertinent reports  Portions of the record may have been created with voice recognition software  Occasional wrong word or "sound a like" substitutions may have occurred due to the inherent limitations of voice recognition software  Read the chart carefully and recognize, using context, where substitutions have occurred

## 2021-04-14 ENCOUNTER — OFFICE VISIT (OUTPATIENT)
Dept: CARDIOLOGY CLINIC | Facility: CLINIC | Age: 61
End: 2021-04-14
Payer: MEDICARE

## 2021-04-14 VITALS
DIASTOLIC BLOOD PRESSURE: 76 MMHG | HEIGHT: 69 IN | WEIGHT: 241.4 LBS | OXYGEN SATURATION: 98 % | SYSTOLIC BLOOD PRESSURE: 124 MMHG | HEART RATE: 74 BPM | BODY MASS INDEX: 35.76 KG/M2

## 2021-04-14 DIAGNOSIS — I25.10 CORONARY ARTERY DISEASE INVOLVING NATIVE CORONARY ARTERY OF NATIVE HEART WITHOUT ANGINA PECTORIS: Primary | ICD-10-CM

## 2021-04-14 DIAGNOSIS — Z99.89 OBSTRUCTIVE SLEEP APNEA ON CPAP: ICD-10-CM

## 2021-04-14 DIAGNOSIS — R00.2 PALPITATIONS: ICD-10-CM

## 2021-04-14 DIAGNOSIS — Z95.5 HISTORY OF HEART ARTERY STENT: ICD-10-CM

## 2021-04-14 DIAGNOSIS — G47.33 OBSTRUCTIVE SLEEP APNEA ON CPAP: ICD-10-CM

## 2021-04-14 DIAGNOSIS — I49.3 PVC'S (PREMATURE VENTRICULAR CONTRACTIONS): ICD-10-CM

## 2021-04-14 DIAGNOSIS — E78.2 MIXED HYPERLIPIDEMIA: ICD-10-CM

## 2021-04-14 DIAGNOSIS — I82.5Z9 CHRONIC DEEP VEIN THROMBOSIS (DVT) OF DISTAL VEIN OF LOWER EXTREMITY, UNSPECIFIED LATERALITY (HCC): ICD-10-CM

## 2021-04-14 DIAGNOSIS — R07.89 OTHER CHEST PAIN: ICD-10-CM

## 2021-04-14 DIAGNOSIS — I10 ESSENTIAL HYPERTENSION: ICD-10-CM

## 2021-04-14 PROCEDURE — 99214 OFFICE O/P EST MOD 30 MIN: CPT | Performed by: INTERNAL MEDICINE

## 2021-04-14 NOTE — LETTER
April 14, 2021     Stacia Stephens DO  [address]    Patient: Angélica Guy   YOB: 1960   Date of Visit: 4/14/2021       Dear Dr Pratik Cannon: Thank you for referring Angélica Guy to me for evaluation  Below are my notes for this consultation  If you have questions, please do not hesitate to call me  I look forward to following your patient along with you  Sincerely,        Zeny Bhardwaj MD        CC: No Recipients  Zeny Bhardwaj MD  4/14/2021 10:03 AM  Sign when Signing Visit                                             Cardiology Follow Up    Angélica Guy  1960  [facility]  [address] [facility] [address] [phone]    1  Coronary artery disease involving native coronary artery of native heart without angina pectoris     2  Mixed hyperlipidemia     3  PVC's (premature ventricular contractions)     4  Essential hypertension     5  Kissing stent the LAD the large diagonal bifurcation performed in 2009      6  Atypical chest pain     7  Chronic deep vein thrombosis (DVT) of distal vein of lower extremity, unspecified laterality ([facility] [address] )     8  Obstructive sleep apnea on CPAP     9  Palpitations         Patient has a history of stenting of the LAD and a large diagonal branch using bifurcation stents with crush technique  Following placement of the stents, it was unable to rewire the diagonal to dilate the or for this of the diagonal branch  Due to complaints of chest discomfort, in 2010 another attempt was made unsuccessfully to dilate the origin of the diagonal branch  Patient has had chest discomfort off and on over the years although most recently it has been mild and infrequent  He also has an esophageal stricture and esophagitis which is confusing with his chest pain  He has undergone periodic esophageal dilatations      Other diagnoses include hypertension, gastritis, hiatal hernia, lung nodule, DVT for which he had been on Coumadin but it was stopped at the time of his visit on May 1, 2018  Hyperlipidemia and sleep apnea  11/14/2019 nuclear stress test: Normal left ventricular systolic function, EF 73%  Normal tomographic perfusion series  No symptoms of angina pectoris or electrocardiographic changes of ischemia by Lexiscan stress    11/14/2019 echocardiogram: Normal left ventricular systolic function, EF 69%  Normal left ventricular chamber size  Right ventricular mild enlargement  Normal right ventricular function  Aortic valve not well visualized  No aortic stenosis  Normal left ventricular diastolic function  05/25/2019 lipid profile: Total cholesterol 113, triglycerides 145, HDL 37, LDL calculated 53  Non HDL cholesterol 76    01/17/2020 lipid profile: Total cholesterol 133, triglycerides 165, HDL 55, LDL calculated 45, non HDL cholesterol 78  Interval History: Patient has occasional atypical chest pain which he has had for many years  He has MARVIN which has been stable  Patient denies chest discomfort or shortness of breath  Patient has no palpitations  Patient denies symptoms of dizziness, lightheadedness or near-syncope/syncope  Patient denies leg edema  Patient denies symptoms of orthopnea or paroxysmal nocturnal dyspnea  Patients biggest problem is back pain  He has had 4 surgeries on his lower back but still has significant pain  Discussion/Summary:   1  Please have Dr Tristian Magana send us a copy of patient's last lipid profile  2   Return in 6 months    Patient Active Problem List   Diagnosis    Coronary artery disease    Kissing stent the LAD the large diagonal bifurcation performed in 2009     Atypical chest pain    Essential hypertension    Chronic deep vein thrombosis (DVT) of distal vein of lower extremity (HCC)    Obstructive sleep apnea on CPAP    Hyperlipidemia    Palpitations    PVC's (premature ventricular contractions)     Past Medical History: Diagnosis Date    Coronary artery disease     Hiatal hernia     Hyperlipidemia     Lung nodule     Obstructive sleep apnea on CPAP     SLEEP APNEA WITH CPAP    Palpitations      Social History     Socioeconomic History    Marital status: /Civil Union     Spouse name: Not on file    Number of children: Not on file    Years of education: Not on file    Highest education level: Not on file   Occupational History    Not on file   Social Needs    Financial resource strain: Not on file    Food insecurity     Worry: Not on file     Inability: Not on file   [employer] needs     Medical: Not on file     Non-medical: Not on file   Tobacco Use    Smoking status: Former Smoker     Packs/day: 2 00     Types: Cigarettes    Smokeless tobacco: Former User    Tobacco comment: TOBACCO USE   Substance and Sexual Activity    Alcohol use: Not on file    Drug use: Not on file    Sexual activity: Not on file   Lifestyle    Physical activity     Days per week: Not on file     Minutes per session: Not on file    Stress: Not on file   Relationships    Social connections     Talks on phone: Not on file     Gets together: Not on file     Attends Moravian service: Not on file     Active member of club or organization: Not on file     Attends meetings of clubs or organizations: Not on file     Relationship status: Not on file    Intimate partner violence     Fear of current or ex partner: Not on file     Emotionally abused: Not on file     Physically abused: Not on file     Forced sexual activity: Not on file   Other Topics Concern    Not on file   Social History Narrative    Not on file      Family History   Problem Relation Age of Onset    Heart failure Mother         COD    Hypertension Mother         COD    Diabetes Mother         DM; COD    Heart failure Father         COD    Coronary artery disease Father         COD    Hypertension Father         COD    Heart attack Father         MI'S; COD  Hypertension Brother      Past Surgical History:   Procedure Laterality Date    BACK SURGERY  2007    CHOLECYSTECTOMY  1999    CORONARY ANGIOPLASTY  2010    FAILED PTCA OF THE DIAGONAL BRANCH    ESOPHAGEAL DILATION      X4    INSERTION STENT ARTERY  2010    XIENCE STENT OF THE LAD & DIAGONAL    LITHOTRIPSY      NISSEN FUNDOPLICATION  6281    SINUS SURGERY         Current Outpatient Medications:     albuterol (VENTOLIN HFA) 90 mcg/act inhaler, Ventolin HFA 90 mcg/actuation aerosol inhaler  INHALE 2 PUFFS EVERY 4 HOURS AS NEEDED, Disp: , Rfl:     ALPRAZolam (XANAX) 0 25 mg tablet, TAKE ONE TABLET BY MOUTH TWICE A DAY AS NEEDED FOR ANXIETY, Disp: , Rfl: 5    aspirin (ECOTRIN LOW STRENGTH) 81 mg EC tablet, Take 81 mg by mouth daily, Disp: , Rfl:     carvedilol (COREG) 12 5 mg tablet, Take 1 tablet (12 5 mg total) by mouth 2 (two) times a day with meals, Disp: 30 tablet, Rfl: 5    CLONIDINE HCL PO, Take by mouth, Disp: , Rfl:     clopidogrel (PLAVIX) 75 mg tablet, clopidogrel 75 mg tablet  TAKE ONE TABLET BY MOUTH EVERY DAY, Disp: , Rfl:     folic acid (FOLVITE) 1 mg tablet, every 24 hours, Disp: , Rfl:     furosemide (LASIX) 40 mg tablet, Take 40 mg by mouth daily, Disp: , Rfl:     isosorbide mononitrate (IMDUR) 30 mg 24 hr tablet, every 24 hours, Disp: , Rfl:     magnesium chloride-calcium (MAG-SR PLUS CALCIUM)  mg, one pill twice a day, Disp: , Rfl:     meclizine (ANTIVERT) 25 mg tablet, Take by mouth, Disp: , Rfl:     NATURAL VITAMIN D-3 5000 units TABS, Take 1 tablet by mouth daily, Disp: , Rfl: 5    niacin (NIASPAN) 1000 MG CR tablet, Take 1,000 mg by mouth daily at bedtime, Disp: , Rfl:     oxyCODONE (ROXICODONE) 5 mg immediate release tablet, Take 5 mg by mouth every 4 (four) hours as needed for moderate pain, Disp: , Rfl:     pantoprazole (PROTONIX) 40 mg tablet, Take 40 mg by mouth daily, Disp: , Rfl: 5    potassium chloride (MICRO-K) 10 MEQ CR capsule, potassium chloride ER 10 mEq tablet,extended release  TAKE ONE TABLET BY MOUTH THREE TIMES A DAY, Disp: , Rfl:     ranolazine (RANEXA) 1000 MG SR tablet, Every 12 hours, Disp: , Rfl:     rosuvastatin (CRESTOR) 40 MG tablet, every 24 hours, Disp: , Rfl:     sertraline (ZOLOFT) 50 mg tablet, Take 50 mg by mouth daily, Disp: , Rfl: 5    gabapentin (NEURONTIN) 600 MG tablet, gabapentin 600 mg tablet  TAKE ONE TABLET BY MOUTH THREE TIMES A DAY, Disp: , Rfl:     ketoconazole (NIZORAL) 2 % cream, APPLY TO FACIAL RASH ONCE DAILY, Disp: , Rfl: 3  Allergies   Allergen Reactions    Levofloxacin Abdominal Pain    Quinolones Abdominal Pain     Other reaction(s): Other (See Comments), Stomach Pain  abd pain  Abdominal pain  Abdominal pain         No results found for this visit on 04/14/21  Review of Systems:  Review of Systems   Constitutional: Negative for activity change  Respiratory: Negative for cough, chest tightness, shortness of breath and wheezing  Cardiovascular: Negative for chest pain, palpitations and leg swelling  Musculoskeletal: Negative for gait problem  Skin: Negative for color change  Neurological: Negative for dizziness, tremors, syncope, weakness, light-headedness and headaches  Psychiatric/Behavioral: Negative for agitation and confusion  Physical Exam:  /76   Pulse 74   Ht 5' 9" (1 753 m)   Wt 109 kg (241 lb 6 4 oz)   SpO2 98%   BMI 35 65 kg/m²   Physical Exam  Vitals signs reviewed  Constitutional:       General: He is not in acute distress  Appearance: He is well-developed  HENT:      Head: Normocephalic and atraumatic  Neck:      Vascular: No carotid bruit or JVD  Cardiovascular:      Rate and Rhythm: Normal rate and regular rhythm  Heart sounds: Normal heart sounds  No murmur  No friction rub  No gallop  Pulmonary:      Effort: Pulmonary effort is normal  No respiratory distress  Breath sounds: Normal breath sounds  No wheezing, rhonchi or rales     Chest: Chest wall: No tenderness  Abdominal:      General: Bowel sounds are normal  There is no distension  Palpations: Abdomen is soft  Musculoskeletal:      Right lower leg: No edema  Left lower leg: No edema  Skin:     General: Skin is warm and dry  Neurological:      General: No focal deficit present  Mental Status: He is alert and oriented to person, place, and time  Psychiatric:         Mood and Affect: Mood normal          Behavior: Behavior normal          Thought Content: Thought content normal          Judgment: Judgment normal          -------------------------------------------------------------------------------  TREADMILL STRESS  No results found for this or any previous visit    ----------------------------------------------------------------------------------------------  NUCLEAR STRESS TEST: No results found for this or any previous visit  No results found for this or any previous visit     --------------------------------------------------------------------------------  CATH:  No results found for this or any previous visit   --------------------------------------------------------------------------------  ECHO:   No results found for this or any previous visit  No results found for this or any previous visit   --------------------------------------------------------------------------------  HOLTER  No results found for this or any previous visit    No results found for this or any previous visit   --------------------------------------------------------------------------------  CAROTIDS  No results found for this or any previous visit    ======================================================    No results found for: WBC, HGB, HCT, MCV, PLT   No results found for: SODIUM, K, CL, CO2, BUN, CREATININE, GLUC, CALCIUM   Lab Results   Component Value Date    HGBA1C 6 5 (H) 01/17/2020      No results found for: CHOL  No results found for: HDL  No results found for: LDLCALC  No results found for: TRIG  No results found for: CHOLHDL   No results found for: INR, PROTIME     Imaging:   {Results Review Statement:92862}      Portions of the record may have been created with voice recognition software  Occasional wrong word or "sound a like" substitutions may have occurred due to the inherent limitations of voice recognition software  Read the chart carefully and recognize, using context, where substitutions have occurred

## 2021-04-14 NOTE — PROGRESS NOTES
Cardiology Follow Up    Jagdeep Rinaldi  1960  [id number]  [address] [place] [id number] [phone]    1  Coronary artery disease involving native coronary artery of native heart without angina pectoris     2  Mixed hyperlipidemia     3  PVC's (premature ventricular contractions)     4  Essential hypertension     5  Kissing stent the LAD the large diagonal bifurcation performed in 2009      6  Atypical chest pain     7  Chronic deep vein thrombosis (DVT) of distal vein of lower extremity, unspecified laterality ([address] )     8  Obstructive sleep apnea on CPAP     9  Palpitations         Patient has a history of stenting of the LAD and a large diagonal branch using bifurcation stents with crush technique  Following placement of the stents, it was unable to rewire the diagonal to dilate the or for this of the diagonal branch  Due to complaints of chest discomfort, in 2010 another attempt was made unsuccessfully to dilate the origin of the diagonal branch  Patient has had chest discomfort off and on over the years although most recently it has been mild and infrequent  He also has an esophageal stricture and esophagitis which is confusing with his chest pain  He has undergone periodic esophageal dilatations  Other diagnoses include hypertension, gastritis, hiatal hernia, lung nodule, DVT for which he had been on Coumadin but it was stopped at the time of his visit on May 1, 2018  Hyperlipidemia and sleep apnea  11/14/2019 nuclear stress test: Normal left ventricular systolic function, EF 34%  Normal tomographic perfusion series  No symptoms of angina pectoris or electrocardiographic changes of ischemia by Lexiscan stress    11/14/2019 echocardiogram: Normal left ventricular systolic function, EF 88%  Normal left ventricular chamber size  Right ventricular mild enlargement   Normal right ventricular function  Aortic valve not well visualized  No aortic stenosis  Normal left ventricular diastolic function  05/25/2019 lipid profile: Total cholesterol 113, triglycerides 145, HDL 37, LDL calculated 53  Non HDL cholesterol 76    01/17/2020 lipid profile: Total cholesterol 133, triglycerides 165, HDL 55, LDL calculated 45, non HDL cholesterol 78  Interval History: Patient has occasional atypical chest pain which he has had for many years  He has MARVIN which has been stable  Patient denies chest discomfort or shortness of breath  Patient has no palpitations  Patient denies symptoms of dizziness, lightheadedness or near-syncope/syncope  Patient denies leg edema  Patient denies symptoms of orthopnea or paroxysmal nocturnal dyspnea  Patients biggest problem is back pain  He has had 4 surgeries on his lower back but still has significant pain  Discussion/Summary:   1  Please have Dr Salmon Able send us a copy of patient's last lipid profile  2   Return in 6 months    Patient Active Problem List   Diagnosis    Coronary artery disease    Kissing stent the LAD the large diagonal bifurcation performed in 2009     Atypical chest pain    Essential hypertension    Chronic deep vein thrombosis (DVT) of distal vein of lower extremity (HCC)    Obstructive sleep apnea on CPAP    Hyperlipidemia    Palpitations    PVC's (premature ventricular contractions)     Past Medical History:   Diagnosis Date    Coronary artery disease     Hiatal hernia     Hyperlipidemia     Lung nodule     Obstructive sleep apnea on CPAP     SLEEP APNEA WITH CPAP    Palpitations      Social History     Socioeconomic History    Marital status: /Civil Union     Spouse name: Not on file    Number of children: Not on file    Years of education: Not on file    Highest education level: Not on file   Occupational History    Not on file   Social Needs    Financial resource strain: Not on file    Food insecurity     Worry: Not on file     Inability: Not on file    Transportation needs     Medical: Not on file     Non-medical: Not on file   Tobacco Use    Smoking status: Former Smoker     Packs/day: 2 00     Types: Cigarettes    Smokeless tobacco: Former User    Tobacco comment: TOBACCO USE   Substance and Sexual Activity    Alcohol use: Not on file    Drug use: Not on file    Sexual activity: Not on file   Lifestyle    Physical activity     Days per week: Not on file     Minutes per session: Not on file    Stress: Not on file   Relationships    Social connections     Talks on phone: Not on file     Gets together: Not on file     Attends Rastafari service: Not on file     Active member of club or organization: Not on file     Attends meetings of clubs or organizations: Not on file     Relationship status: Not on file    Intimate partner violence     Fear of current or ex partner: Not on file     Emotionally abused: Not on file     Physically abused: Not on file     Forced sexual activity: Not on file   Other Topics Concern    Not on file   Social History Narrative    Not on file      Family History   Problem Relation Age of Onset    Heart failure Mother         COD    Hypertension Mother         COD    Diabetes Mother         DM; COD    Heart failure Father         COD    Coronary artery disease Father         COD    Hypertension Father         COD    Heart attack Father         MI'S; COD    Hypertension Brother      Past Surgical History:   Procedure Laterality Date    BACK SURGERY  2007    CHOLECYSTECTOMY  1999    CORONARY ANGIOPLASTY  2010    FAILED PTCA OF THE DIAGONAL BRANCH    ESOPHAGEAL DILATION      X4    INSERTION STENT ARTERY  2010    XIENCE STENT OF THE LAD & DIAGONAL    LITHOTRIPSY      NISSEN FUNDOPLICATION  6421    SINUS SURGERY         Current Outpatient Medications:     albuterol (VENTOLIN HFA) 90 mcg/act inhaler, Ventolin HFA 90 mcg/actuation aerosol inhaler  INHALE 2 PUFFS EVERY 4 HOURS AS NEEDED, Disp: , Rfl:     ALPRAZolam (XANAX) 0 25 mg tablet, TAKE ONE TABLET BY MOUTH TWICE A DAY AS NEEDED FOR ANXIETY, Disp: , Rfl: 5    aspirin (ECOTRIN LOW STRENGTH) 81 mg EC tablet, Take 81 mg by mouth daily, Disp: , Rfl:     carvedilol (COREG) 12 5 mg tablet, Take 1 tablet (12 5 mg total) by mouth 2 (two) times a day with meals, Disp: 30 tablet, Rfl: 5    CLONIDINE HCL PO, Take by mouth, Disp: , Rfl:     clopidogrel (PLAVIX) 75 mg tablet, clopidogrel 75 mg tablet  TAKE ONE TABLET BY MOUTH EVERY DAY, Disp: , Rfl:     folic acid (FOLVITE) 1 mg tablet, every 24 hours, Disp: , Rfl:     furosemide (LASIX) 40 mg tablet, Take 40 mg by mouth daily, Disp: , Rfl:     isosorbide mononitrate (IMDUR) 30 mg 24 hr tablet, every 24 hours, Disp: , Rfl:     magnesium chloride-calcium (MAG-SR PLUS CALCIUM)  mg, one pill twice a day, Disp: , Rfl:     meclizine (ANTIVERT) 25 mg tablet, Take by mouth, Disp: , Rfl:     NATURAL VITAMIN D-3 5000 units TABS, Take 1 tablet by mouth daily, Disp: , Rfl: 5    niacin (NIASPAN) 1000 MG CR tablet, Take 1,000 mg by mouth daily at bedtime, Disp: , Rfl:     oxyCODONE (ROXICODONE) 5 mg immediate release tablet, Take 5 mg by mouth every 4 (four) hours as needed for moderate pain, Disp: , Rfl:     pantoprazole (PROTONIX) 40 mg tablet, Take 40 mg by mouth daily, Disp: , Rfl: 5    potassium chloride (MICRO-K) 10 MEQ CR capsule, potassium chloride ER 10 mEq tablet,extended release  TAKE ONE TABLET BY MOUTH THREE TIMES A DAY, Disp: , Rfl:     ranolazine (RANEXA) 1000 MG SR tablet, Every 12 hours, Disp: , Rfl:     rosuvastatin (CRESTOR) 40 MG tablet, every 24 hours, Disp: , Rfl:     sertraline (ZOLOFT) 50 mg tablet, Take 50 mg by mouth daily, Disp: , Rfl: 5    gabapentin (NEURONTIN) 600 MG tablet, gabapentin 600 mg tablet  TAKE ONE TABLET BY MOUTH THREE TIMES A DAY, Disp: , Rfl:     ketoconazole (NIZORAL) 2 % cream, APPLY TO FACIAL RASH ONCE DAILY, Disp: , Rfl: 3  Allergies Allergen Reactions    Levofloxacin Abdominal Pain    Quinolones Abdominal Pain     Other reaction(s): Other (See Comments), Stomach Pain  abd pain  Abdominal pain  Abdominal pain         No results found for this visit on 04/14/21  Review of Systems:  Review of Systems   Constitutional: Negative for activity change  Respiratory: Negative for cough, chest tightness, shortness of breath and wheezing  Cardiovascular: Negative for chest pain, palpitations and leg swelling  Musculoskeletal: Negative for gait problem  Skin: Negative for color change  Neurological: Negative for dizziness, tremors, syncope, weakness, light-headedness and headaches  Psychiatric/Behavioral: Negative for agitation and confusion  Physical Exam:  /76   Pulse 74   Ht 5' 9" (1 753 m)   Wt 109 kg (241 lb 6 4 oz)   SpO2 98%   BMI 35 65 kg/m²   Physical Exam  Vitals signs reviewed  Constitutional:       General: He is not in acute distress  Appearance: He is well-developed  HENT:      Head: Normocephalic and atraumatic  Neck:      Vascular: No carotid bruit or JVD  Cardiovascular:      Rate and Rhythm: Normal rate and regular rhythm  Heart sounds: Normal heart sounds  No murmur  No friction rub  No gallop  Pulmonary:      Effort: Pulmonary effort is normal  No respiratory distress  Breath sounds: Normal breath sounds  No wheezing, rhonchi or rales  Chest:      Chest wall: No tenderness  Abdominal:      General: Bowel sounds are normal  There is no distension  Palpations: Abdomen is soft  Musculoskeletal:      Right lower leg: No edema  Left lower leg: No edema  Skin:     General: Skin is warm and dry  Neurological:      General: No focal deficit present  Mental Status: He is alert and oriented to person, place, and time  Psychiatric:         Mood and Affect: Mood normal          Behavior: Behavior normal          Thought Content:  Thought content normal  Judgment: Judgment normal          ======================================================      Lab Results   Component Value Date    HGBA1C 6 5 (H) 01/17/2020          Imaging:   I have personally reviewed pertinent reports  Portions of the record may have been created with voice recognition software  Occasional wrong word or "sound a like" substitutions may have occurred due to the inherent limitations of voice recognition software  Read the chart carefully and recognize, using context, where substitutions have occurred

## 2021-04-14 NOTE — LETTER
April 14, 2021     Gabriel Presume, DO  [address]    Patient: Renetta Kingston   YOB: 1960   Date of Visit: 4/14/2021       Dear Dr Isiah Beebe: Thank you for referring Renetta Kingston to me for evaluation  Below are my notes for this consultation  If you have questions, please do not hesitate to call me  I look forward to following your patient along with you  Sincerely,        Rolan Katz MD        CC: No Recipients  Rolan Katz MD  4/14/2021 10:04 AM  Sign when Signing Visit                                             Cardiology Follow Up    Renetta Kingston  1960  [facility]  [address] [address] [phone]    1  Coronary artery disease involving native coronary artery of native heart without angina pectoris     2  Mixed hyperlipidemia     3  PVC's (premature ventricular contractions)     4  Essential hypertension     5  Kissing stent the LAD the large diagonal bifurcation performed in 2009      6  Atypical chest pain     7  Chronic deep vein thrombosis (DVT) of distal vein of lower extremity, unspecified laterality ([address] )     8  Obstructive sleep apnea on CPAP     9  Palpitations         Patient has a history of stenting of the LAD and a large diagonal branch using bifurcation stents with crush technique  Following placement of the stents, it was unable to rewire the diagonal to dilate the or for this of the diagonal branch  Due to complaints of chest discomfort, in 2010 another attempt was made unsuccessfully to dilate the origin of the diagonal branch  Patient has had chest discomfort off and on over the years although most recently it has been mild and infrequent  He also has an esophageal stricture and esophagitis which is confusing with his chest pain  He has undergone periodic esophageal dilatations      Other diagnoses include hypertension, gastritis, hiatal hernia, lung nodule, DVT for which he had been on Coumadin but it was stopped at the time of his visit on May 1, 2018  Hyperlipidemia and sleep apnea  11/14/2019 nuclear stress test: Normal left ventricular systolic function, EF 98%  Normal tomographic perfusion series  No symptoms of angina pectoris or electrocardiographic changes of ischemia by Lexiscan stress    11/14/2019 echocardiogram: Normal left ventricular systolic function, EF 93%  Normal left ventricular chamber size  Right ventricular mild enlargement  Normal right ventricular function  Aortic valve not well visualized  No aortic stenosis  Normal left ventricular diastolic function  05/25/2019 lipid profile: Total cholesterol 113, triglycerides 145, HDL 37, LDL calculated 53  Non HDL cholesterol 76    01/17/2020 lipid profile: Total cholesterol 133, triglycerides 165, HDL 55, LDL calculated 45, non HDL cholesterol 78  Interval History: Patient has occasional atypical chest pain which he has had for many years  He has MARVIN which has been stable  Patient denies chest discomfort or shortness of breath  Patient has no palpitations  Patient denies symptoms of dizziness, lightheadedness or near-syncope/syncope  Patient denies leg edema  Patient denies symptoms of orthopnea or paroxysmal nocturnal dyspnea  Patients biggest problem is back pain  He has had 4 surgeries on his lower back but still has significant pain  Discussion/Summary:   1  Please have Dr Sierra Lim send us a copy of patient's last lipid profile  2   Return in 6 months    Patient Active Problem List   Diagnosis    Coronary artery disease    Kissing stent the LAD the large diagonal bifurcation performed in 2009     Atypical chest pain    Essential hypertension    Chronic deep vein thrombosis (DVT) of distal vein of lower extremity (HCC)    Obstructive sleep apnea on CPAP    Hyperlipidemia    Palpitations    PVC's (premature ventricular contractions)     Past Medical History: Diagnosis Date    Coronary artery disease     Hiatal hernia     Hyperlipidemia     Lung nodule     Obstructive sleep apnea on CPAP     SLEEP APNEA WITH CPAP    Palpitations      Social History     Socioeconomic History    Marital status: /Civil Union     Spouse name: Not on file    Number of children: Not on file    Years of education: Not on file    Highest education level: Not on file   Occupational History    Not on file   Social Needs    Financial resource strain: Not on file    Food insecurity     Worry: Not on file     Inability: Not on file   [employer] needs     Medical: Not on file     Non-medical: Not on file   Tobacco Use    Smoking status: Former Smoker     Packs/day: 2 00     Types: Cigarettes    Smokeless tobacco: Former User    Tobacco comment: TOBACCO USE   Substance and Sexual Activity    Alcohol use: Not on file    Drug use: Not on file    Sexual activity: Not on file   Lifestyle    Physical activity     Days per week: Not on file     Minutes per session: Not on file    Stress: Not on file   Relationships    Social connections     Talks on phone: Not on file     Gets together: Not on file     Attends [employer] service: Not on file     Active member of club or organization: Not on file     Attends meetings of clubs or organizations: Not on file     Relationship status: Not on file    Intimate partner violence     Fear of current or ex partner: Not on file     Emotionally abused: Not on file     Physically abused: Not on file     Forced sexual activity: Not on file   Other Topics Concern    Not on file   Social History Narrative    Not on file      Family History   Problem Relation Age of Onset    Heart failure Mother         COD    Hypertension Mother         COD    Diabetes Mother         DM; COD    Heart failure Father         COD    Coronary artery disease Father         COD    Hypertension Father         COD    Heart attack Father         MI'S; COD  Hypertension Brother      Past Surgical History:   Procedure Laterality Date    BACK SURGERY  2007    CHOLECYSTECTOMY  1999    CORONARY ANGIOPLASTY  2010    FAILED PTCA OF THE DIAGONAL BRANCH    ESOPHAGEAL DILATION      X4    INSERTION STENT ARTERY  2010    XIENCE STENT OF THE LAD & DIAGONAL    LITHOTRIPSY      NISSEN FUNDOPLICATION  1508    SINUS SURGERY         Current Outpatient Medications:     albuterol (VENTOLIN HFA) 90 mcg/act inhaler, Ventolin HFA 90 mcg/actuation aerosol inhaler  INHALE 2 PUFFS EVERY 4 HOURS AS NEEDED, Disp: , Rfl:     ALPRAZolam (XANAX) 0 25 mg tablet, TAKE ONE TABLET BY MOUTH TWICE A DAY AS NEEDED FOR ANXIETY, Disp: , Rfl: 5    aspirin (ECOTRIN LOW STRENGTH) 81 mg EC tablet, Take 81 mg by mouth daily, Disp: , Rfl:     carvedilol (COREG) 12 5 mg tablet, Take 1 tablet (12 5 mg total) by mouth 2 (two) times a day with meals, Disp: 30 tablet, Rfl: 5    CLONIDINE HCL PO, Take by mouth, Disp: , Rfl:     clopidogrel (PLAVIX) 75 mg tablet, clopidogrel 75 mg tablet  TAKE ONE TABLET BY MOUTH EVERY DAY, Disp: , Rfl:     folic acid (FOLVITE) 1 mg tablet, every 24 hours, Disp: , Rfl:     furosemide (LASIX) 40 mg tablet, Take 40 mg by mouth daily, Disp: , Rfl:     isosorbide mononitrate (IMDUR) 30 mg 24 hr tablet, every 24 hours, Disp: , Rfl:     magnesium chloride-calcium (MAG-SR PLUS CALCIUM)  mg, one pill twice a day, Disp: , Rfl:     meclizine (ANTIVERT) 25 mg tablet, Take by mouth, Disp: , Rfl:     NATURAL VITAMIN D-3 5000 units TABS, Take 1 tablet by mouth daily, Disp: , Rfl: 5    niacin (NIASPAN) 1000 MG CR tablet, Take 1,000 mg by mouth daily at bedtime, Disp: , Rfl:     oxyCODONE (ROXICODONE) 5 mg immediate release tablet, Take 5 mg by mouth every 4 (four) hours as needed for moderate pain, Disp: , Rfl:     pantoprazole (PROTONIX) 40 mg tablet, Take 40 mg by mouth daily, Disp: , Rfl: 5    potassium chloride (MICRO-K) 10 MEQ CR capsule, potassium chloride ER 10 mEq tablet,extended release  TAKE ONE TABLET BY MOUTH THREE TIMES A DAY, Disp: , Rfl:     ranolazine (RANEXA) 1000 MG SR tablet, Every 12 hours, Disp: , Rfl:     rosuvastatin (CRESTOR) 40 MG tablet, every 24 hours, Disp: , Rfl:     sertraline (ZOLOFT) 50 mg tablet, Take 50 mg by mouth daily, Disp: , Rfl: 5    gabapentin (NEURONTIN) 600 MG tablet, gabapentin 600 mg tablet  TAKE ONE TABLET BY MOUTH THREE TIMES A DAY, Disp: , Rfl:     ketoconazole (NIZORAL) 2 % cream, APPLY TO FACIAL RASH ONCE DAILY, Disp: , Rfl: 3  Allergies   Allergen Reactions    Levofloxacin Abdominal Pain    Quinolones Abdominal Pain     Other reaction(s): Other (See Comments), Stomach Pain  abd pain  Abdominal pain  Abdominal pain         No results found for this visit on 04/14/21  Review of Systems:  Review of Systems   Constitutional: Negative for activity change  Respiratory: Negative for cough, chest tightness, shortness of breath and wheezing  Cardiovascular: Negative for chest pain, palpitations and leg swelling  Musculoskeletal: Negative for gait problem  Skin: Negative for color change  Neurological: Negative for dizziness, tremors, syncope, weakness, light-headedness and headaches  Psychiatric/Behavioral: Negative for agitation and confusion  Physical Exam:  /76   Pulse 74   Ht 5' 9" (1 753 m)   Wt 109 kg (241 lb 6 4 oz)   SpO2 98%   BMI 35 65 kg/m²   Physical Exam  Vitals signs reviewed  Constitutional:       General: He is not in acute distress  Appearance: He is well-developed  HENT:      Head: Normocephalic and atraumatic  Neck:      Vascular: No carotid bruit or JVD  Cardiovascular:      Rate and Rhythm: Normal rate and regular rhythm  Heart sounds: Normal heart sounds  No murmur  No friction rub  No gallop  Pulmonary:      Effort: Pulmonary effort is normal  No respiratory distress  Breath sounds: Normal breath sounds  No wheezing, rhonchi or rales     Chest: Chest wall: No tenderness  Abdominal:      General: Bowel sounds are normal  There is no distension  Palpations: Abdomen is soft  Musculoskeletal:      Right lower leg: No edema  Left lower leg: No edema  Skin:     General: Skin is warm and dry  Neurological:      General: No focal deficit present  Mental Status: He is alert and oriented to person, place, and time  Psychiatric:         Mood and Affect: Mood normal          Behavior: Behavior normal          Thought Content: Thought content normal          Judgment: Judgment normal          ======================================================      Lab Results   Component Value Date    HGBA1C 6 5 (H) 01/17/2020          Imaging:   I have personally reviewed pertinent reports  Portions of the record may have been created with voice recognition software  Occasional wrong word or "sound a like" substitutions may have occurred due to the inherent limitations of voice recognition software  Read the chart carefully and recognize, using context, where substitutions have occurred

## 2022-09-20 ENCOUNTER — OFFICE VISIT (OUTPATIENT)
Dept: CARDIOLOGY CLINIC | Facility: CLINIC | Age: 62
End: 2022-09-20
Payer: MEDICARE

## 2022-09-20 VITALS
BODY MASS INDEX: 35.87 KG/M2 | WEIGHT: 242.2 LBS | DIASTOLIC BLOOD PRESSURE: 82 MMHG | HEIGHT: 69 IN | HEART RATE: 71 BPM | SYSTOLIC BLOOD PRESSURE: 128 MMHG

## 2022-09-20 DIAGNOSIS — I49.3 PVC'S (PREMATURE VENTRICULAR CONTRACTIONS): ICD-10-CM

## 2022-09-20 DIAGNOSIS — Z95.5 HISTORY OF HEART ARTERY STENT: ICD-10-CM

## 2022-09-20 DIAGNOSIS — I10 ESSENTIAL HYPERTENSION: ICD-10-CM

## 2022-09-20 DIAGNOSIS — I25.10 CORONARY ARTERY DISEASE INVOLVING NATIVE CORONARY ARTERY OF NATIVE HEART WITHOUT ANGINA PECTORIS: Primary | ICD-10-CM

## 2022-09-20 DIAGNOSIS — Z99.89 OBSTRUCTIVE SLEEP APNEA ON CPAP: ICD-10-CM

## 2022-09-20 DIAGNOSIS — I82.5Z9 CHRONIC DEEP VEIN THROMBOSIS (DVT) OF DISTAL VEIN OF LOWER EXTREMITY, UNSPECIFIED LATERALITY (HCC): ICD-10-CM

## 2022-09-20 DIAGNOSIS — E78.2 MIXED HYPERLIPIDEMIA: ICD-10-CM

## 2022-09-20 DIAGNOSIS — G47.33 OBSTRUCTIVE SLEEP APNEA ON CPAP: ICD-10-CM

## 2022-09-20 DIAGNOSIS — R07.89 OTHER CHEST PAIN: ICD-10-CM

## 2022-09-20 DIAGNOSIS — R00.2 PALPITATIONS: ICD-10-CM

## 2022-09-20 PROCEDURE — 99214 OFFICE O/P EST MOD 30 MIN: CPT | Performed by: INTERNAL MEDICINE

## 2022-09-20 PROCEDURE — 93000 ELECTROCARDIOGRAM COMPLETE: CPT | Performed by: INTERNAL MEDICINE

## 2022-09-20 NOTE — PROGRESS NOTES
[facility]  [address]  [id number]  Phone#  [phone]   Fax#  [phone]  *-*-*-*-*-*-*-*-*-*-*-*-*-*-*-*-*-*-*-*-*-*-*-*-*-*-*-*-*-*-*-*-*-*-*-*-*-*-*-*-*-*-*-*-*-*-*-*-*-*-*-*-*-*                                   Cardiology Follow Up      ENCOUNTER DATE: 22 9:35 AM  PATIENT NAME: Antony Dean   : 1960    MRN: [id number]  AGE:62 y o  SEX: male  [address] MD     PRIMARY CARE PHYSICIAN: Willow Sharif DO    ACTIVE DIAGNOSIS THIS VISIT  1  Coronary artery disease involving native coronary artery of native heart without angina pectoris  POCT ECG   2  Mixed hyperlipidemia     3  PVC's (premature ventricular contractions)     4  Kissing stent the LAD the large diagonal bifurcation performed in       5  Essential hypertension     6  Atypical chest pain     7  Obstructive sleep apnea on CPAP     8  Chronic deep vein thrombosis (DVT) of distal vein of lower extremity, unspecified laterality (HCC)     9  Palpitations       ACTIVE PROBLEM LIST  Patient Active Problem List   Diagnosis    Coronary artery disease    Kissing stent the LAD the large diagonal bifurcation performed in      Atypical chest pain    Essential hypertension    Chronic deep vein thrombosis (DVT) of distal vein of lower extremity (HCC)    Obstructive sleep apnea on CPAP    Hyperlipidemia    Palpitations    PVC's (premature ventricular contractions)       CARDIOLOGY SPECIALTY COMMENTS  Patient has a history of stenting of the LAD and a large diagonal branch using bifurcation stents with crush technique  Following placement of the stents, it was unable to rewire the diagonal to dilate the or for this of the diagonal branch  Due to complaints of chest discomfort, in  another attempt was made unsuccessfully to dilate the origin of the diagonal branch      Patient has had chest discomfort off and on over the years although most recently it has been mild and infrequent  He also has an esophageal stricture and esophagitis which is confusing with his chest pain  He has undergone periodic esophageal dilatations  Other diagnoses include hypertension, gastritis, hiatal hernia, lung nodule, DVT for which he had been on Coumadin but it was stopped at the time of his visit on May 1, 2018  Hyperlipidemia and sleep apnea  11/14/2019 [facility] nuclear stress test: Normal left ventricular systolic function, EF 00%  Normal tomographic perfusion series  No symptoms of angina pectoris or electrocardiographic changes of ischemia by Lexiscan stress    11/15/2019 [facility] echocardiogram: Normal left ventricular systolic function, EF 13%  Normal left ventricular chamber size  Right ventricular mild enlargement  Normal right ventricular function  Aortic valve not well visualized  No aortic stenosis  Normal left ventricular diastolic function  01/16/2020 seen in [facility] ED for chest discomfort and released  10/01/2020 seen in ED in [facility] for left-sided chest pain, shoulder pain and palpitations  Patient ruled out released  05/25/2019 [facility] lipid profile: Total cholesterol 113, triglycerides 145, HDL 37, LDL calculated 53  Non HDL cholesterol 76    01/17/2020 [facility] lipid profile: Total cholesterol 133, triglycerides 165, HDL 55, LDL calculated 45, non HDL cholesterol 78  INTERVAL HISTORY:        Patient has no cardiac complaints  He is having a lot of back pain  Patient denies chest discomfort or shortness of breath  Patient has no palpitations  Patient denies symptoms of dizziness, lightheadedness or near-syncope/syncope  Patient denies leg edema  Patient denies symptoms of orthopnea or paroxysmal nocturnal dyspnea  I have no labs since 2020  Patient states that he is having his blood work drawn in his primary care physician's office any does not know where it is being processed  He will talk to his primary care physician again to send me his labs    I gave him my card so he knew where to send him and what our fax number is  DISCUSSION/PLAN:          Return in 1 year    Results for orders placed or performed in visit on 09/20/22   POCT ECG    Narrative    Normal sinus rhythm at a rate of 71 beats per minute  Minor nonspecific ST T wave changes  Prolonged QT interval   Abnormal EKG           Current Outpatient Medications:     albuterol (PROVENTIL HFA,VENTOLIN HFA) 90 mcg/act inhaler, Ventolin HFA 90 mcg/actuation aerosol inhaler  INHALE 2 PUFFS EVERY 4 HOURS AS NEEDED, Disp: , Rfl:     ALPRAZolam (XANAX) 0 25 mg tablet, TAKE ONE TABLET BY MOUTH TWICE A DAY AS NEEDED FOR ANXIETY, Disp: , Rfl: 5    aspirin (ECOTRIN LOW STRENGTH) 81 mg EC tablet, Take 81 mg by mouth daily, Disp: , Rfl:     carvedilol (COREG) 12 5 mg tablet, Take 1 tablet (12 5 mg total) by mouth 2 (two) times a day with meals, Disp: 30 tablet, Rfl: 5    CLONIDINE HCL PO, Take by mouth, Disp: , Rfl:     clopidogrel (PLAVIX) 75 mg tablet, clopidogrel 75 mg tablet  TAKE ONE TABLET BY MOUTH EVERY DAY, Disp: , Rfl:     folic acid (FOLVITE) 1 mg tablet, every 24 hours, Disp: , Rfl:     furosemide (LASIX) 40 mg tablet, Take 40 mg by mouth daily, Disp: , Rfl:     isosorbide mononitrate (IMDUR) 30 mg 24 hr tablet, every 24 hours, Disp: , Rfl:     magnesium chloride-calcium (MAG-SR PLUS CALCIUM)  mg, one pill twice a day, Disp: , Rfl:     NATURAL VITAMIN D-3 5000 units TABS, Take 1 tablet by mouth daily, Disp: , Rfl: 5    niacin (NIASPAN) 1000 MG CR tablet, Take 1,000 mg by mouth daily at bedtime, Disp: , Rfl:     oxyCODONE (ROXICODONE) 5 immediate release tablet, Take 5 mg by mouth every 4 (four) hours as needed for moderate pain, Disp: , Rfl:     pantoprazole (PROTONIX) 40 mg tablet, Take 40 mg by mouth daily, Disp: , Rfl: 5    potassium chloride (MICRO-K) 10 MEQ CR capsule, potassium chloride ER 10 mEq tablet,extended release  TAKE ONE TABLET BY MOUTH THREE TIMES A DAY, Disp: , Rfl:    ranolazine (RANEXA) 1000 MG SR tablet, Every 12 hours, Disp: , Rfl:     rosuvastatin (CRESTOR) 40 MG tablet, every 24 hours, Disp: , Rfl:     sertraline (ZOLOFT) 50 mg tablet, Take 50 mg by mouth daily, Disp: , Rfl: 5    meclizine (ANTIVERT) 25 mg tablet, Take by mouth (Patient not taking: Reported on 9/20/2022), Disp: , Rfl:   Allergies   Allergen Reactions    Levofloxacin Abdominal Pain    Quinolones Abdominal Pain     Other reaction(s):  Other (See Comments), Stomach Pain  abd pain  Abdominal pain  Abdominal pain         Past Medical History:   Diagnosis Date    Coronary artery disease     Hiatal hernia     Hyperlipidemia     Lung nodule     Obstructive sleep apnea on CPAP     SLEEP APNEA WITH CPAP    Palpitations      Social History     Socioeconomic History    Marital status: /Civil Union     Spouse name: Not on file    Number of children: Not on file    Years of education: Not on file    Highest education level: Not on file   Occupational History    Not on file   Tobacco Use    Smoking status: Former Smoker     Packs/day: 2 00     Types: Cigarettes    Smokeless tobacco: Former User    Tobacco comment: TOBACCO USE   Substance and Sexual Activity    Alcohol use: Not on file    Drug use: Not on file    Sexual activity: Not on file   Other Topics Concern    Not on file   Social History Narrative    Not on file     Social Determinants of Health     Financial Resource Strain: Not on file   Food Insecurity: Not on file   Transportation Needs: Not on file   Physical Activity: Not on file   Stress: Not on file   Social Connections: Not on file   Intimate Partner Violence: Not on file   Housing Stability: Not on file      Family History   Problem Relation Age of Onset    Heart failure Mother         COD    Hypertension Mother         COD    Diabetes Mother         DM; COD    Heart failure Father         COD    Coronary artery disease Father         COD    Hypertension Father COD    Heart attack Father         MI'S; COD    Hypertension Brother      Past Surgical History:   Procedure Laterality Date    BACK SURGERY  2007    CHOLECYSTECTOMY  1999    CORONARY ANGIOPLASTY  2010    FAILED PTCA OF THE DIAGONAL BRANCH    ESOPHAGEAL DILATION      X4    INSERTION STENT ARTERY  2010    XIENCE STENT OF THE LAD & DIAGONAL    LITHOTRIPSY      NISSEN FUNDOPLICATION  0039    SINUS SURGERY         PREVIOUS WEIGHTS:   Wt Readings from Last 10 Encounters:   09/20/22 110 kg (242 lb 3 2 oz)   04/14/21 109 kg (241 lb 6 4 oz)   08/26/20 104 kg (229 lb)   07/11/19 118 kg (259 lb 3 2 oz)        Review of Systems:  Review of Systems   Constitutional: Negative for activity change  Respiratory: Negative for cough, chest tightness, shortness of breath and wheezing  Cardiovascular: Negative for chest pain, palpitations and leg swelling  Musculoskeletal: Negative for gait problem  Skin: Negative for color change  Neurological: Negative for dizziness, tremors, syncope, weakness, light-headedness and headaches  Psychiatric/Behavioral: Negative for agitation and confusion  Physical Exam:  /82 (BP Location: Right arm, Patient Position: Sitting, Cuff Size: Large)   Pulse 71   Ht 5' 9" (1 753 m)   Wt 110 kg (242 lb 3 2 oz)   BMI 35 77 kg/m²     Physical Exam  Vitals reviewed  Constitutional:       General: He is not in acute distress  Appearance: He is well-developed  HENT:      Head: Normocephalic and atraumatic  Neck:      Thyroid: No thyromegaly  Vascular: No carotid bruit or JVD  Trachea: No tracheal deviation  Cardiovascular:      Rate and Rhythm: Normal rate and regular rhythm  Pulses: Normal pulses  Heart sounds: Normal heart sounds  No murmur heard  No friction rub  No gallop  Pulmonary:      Effort: Pulmonary effort is normal  No respiratory distress  Breath sounds: Normal breath sounds  No wheezing, rhonchi or rales     Chest: Chest wall: No tenderness  Musculoskeletal:      Cervical back: Normal range of motion and neck supple  Right lower leg: No edema  Left lower leg: No edema  Skin:     General: Skin is warm and dry  Neurological:      General: No focal deficit present  Mental Status: He is alert and oriented to person, place, and time  Psychiatric:         Mood and Affect: Mood normal          Behavior: Behavior normal          Thought Content: Thought content normal          Judgment: Judgment normal        ======================================================  Imaging:   I have personally reviewed pertinent reports  Portions of the record may have been created with voice recognition software  Occasional wrong word or "sound a like" substitutions may have occurred due to the inherent limitations of voice recognition software  Read the chart carefully and recognize, using context, where substitutions have occurred      SIGNATURES:   Álvaro Villavicencio MD

## 2023-07-22 PROBLEM — K21.9 GASTROESOPHAGEAL REFLUX DISEASE WITHOUT ESOPHAGITIS: Status: ACTIVE | Noted: 2023-07-22

## 2023-07-22 PROBLEM — E66.9 OBESITY, CLASS II, BMI 35-39.9: Status: ACTIVE | Noted: 2023-07-22

## 2023-07-22 PROBLEM — E66.812 OBESITY, CLASS II, BMI 35-39.9: Status: ACTIVE | Noted: 2023-07-22

## 2023-09-22 ENCOUNTER — TELEPHONE (OUTPATIENT)
Dept: CARDIOLOGY CLINIC | Facility: CLINIC | Age: 63
End: 2023-09-22

## 2023-09-25 ENCOUNTER — TELEPHONE (OUTPATIENT)
Dept: PULMONOLOGY | Facility: CLINIC | Age: 63
End: 2023-09-25

## 2023-09-25 NOTE — TELEPHONE ENCOUNTER
Called to see if [place] Joseph wants to RES missed appointment with GI. His wife states he is sleeping and will call us back to RES. He fell and hurt his back.

## 2023-11-13 ENCOUNTER — TELEPHONE (OUTPATIENT)
Dept: CARDIOLOGY CLINIC | Facility: CLINIC | Age: 63
End: 2023-11-13

## 2023-11-13 NOTE — TELEPHONE ENCOUNTER
Called pt to confirm appt as asked by Dr Kal Starkey explained to pt to please be on time this appt is to clear him for a procedure he understood

## 2023-11-16 ENCOUNTER — OFFICE VISIT (OUTPATIENT)
Dept: CARDIOLOGY CLINIC | Facility: CLINIC | Age: 63
End: 2023-11-16
Payer: MEDICARE

## 2023-11-16 VITALS
SYSTOLIC BLOOD PRESSURE: 118 MMHG | BODY MASS INDEX: 35.62 KG/M2 | HEART RATE: 67 BPM | DIASTOLIC BLOOD PRESSURE: 78 MMHG | WEIGHT: 241.2 LBS

## 2023-11-16 DIAGNOSIS — I10 ESSENTIAL HYPERTENSION: ICD-10-CM

## 2023-11-16 DIAGNOSIS — R07.89 OTHER CHEST PAIN: ICD-10-CM

## 2023-11-16 DIAGNOSIS — G47.33 OBSTRUCTIVE SLEEP APNEA ON CPAP: ICD-10-CM

## 2023-11-16 DIAGNOSIS — I82.5Z9 CHRONIC DEEP VEIN THROMBOSIS (DVT) OF DISTAL VEIN OF LOWER EXTREMITY, UNSPECIFIED LATERALITY (HCC): ICD-10-CM

## 2023-11-16 DIAGNOSIS — R00.2 PALPITATIONS: ICD-10-CM

## 2023-11-16 DIAGNOSIS — E66.9 OBESITY, CLASS II, BMI 35-39.9: ICD-10-CM

## 2023-11-16 DIAGNOSIS — K21.9 GASTROESOPHAGEAL REFLUX DISEASE WITHOUT ESOPHAGITIS: ICD-10-CM

## 2023-11-16 DIAGNOSIS — I49.3 PVC'S (PREMATURE VENTRICULAR CONTRACTIONS): ICD-10-CM

## 2023-11-16 DIAGNOSIS — E78.2 MIXED HYPERLIPIDEMIA: ICD-10-CM

## 2023-11-16 DIAGNOSIS — I25.10 CORONARY ARTERY DISEASE INVOLVING NATIVE CORONARY ARTERY OF NATIVE HEART WITHOUT ANGINA PECTORIS: Primary | ICD-10-CM

## 2023-11-16 DIAGNOSIS — Z95.5 HISTORY OF HEART ARTERY STENT: ICD-10-CM

## 2023-11-16 PROCEDURE — 93000 ELECTROCARDIOGRAM COMPLETE: CPT | Performed by: INTERNAL MEDICINE

## 2023-11-16 PROCEDURE — 99214 OFFICE O/P EST MOD 30 MIN: CPT | Performed by: INTERNAL MEDICINE

## 2023-11-16 RX ORDER — LOSARTAN POTASSIUM 25 MG/1
25 TABLET ORAL DAILY
COMMUNITY

## 2023-11-16 NOTE — PROGRESS NOTES
[facility]  [address] [facility] [address] [facility] [address]  Phone#  [phone]   Fax#  [phone]  *-*-*-*-*-*-*-*-*-*-*-*-*-*-*-*-*-*-*-*-*-*-*-*-*-*-*-*-*-*-*-*-*-*-*-*-*-*-*-*-*-*-*-*-*-*-*-*-*-*-*-*-*-*                                   Cardiology Follow Up      ENCOUNTER DATE: 23 11:24 AM  PATIENT NAME: Miguel Julien   : 1960    MRN: [id number]  AGE:63 y.o. SEX: male  [address], [place]     PRIMARY CARE PHYSICIAN: Trang Calvert DO    ACTIVE DIAGNOSIS THIS VISIT  1. Coronary artery disease involving native coronary artery of native heart without angina pectoris  POCT ECG      2. Mixed hyperlipidemia        3. PVC's (premature ventricular contractions)        4. Atypical chest pain        5. Essential hypertension        6. Chronic deep vein thrombosis (DVT) of distal vein of lower extremity, unspecified laterality ([address])        7. Kissing stent the LAD the large diagonal bifurcation performed in          8. Gastroesophageal reflux disease without esophagitis        9. Obesity, Class II, BMI 35-39.9        10. Obstructive sleep apnea on CPAP        11. Palpitations          ACTIVE PROBLEM LIST  Patient Active Problem List   Diagnosis    Coronary artery disease    Kissing stent the LAD the large diagonal bifurcation performed in      Atypical chest pain    Essential hypertension    Chronic deep vein thrombosis (DVT) of distal vein of lower extremity (HCC)    Obstructive sleep apnea on CPAP    Hyperlipidemia    Palpitations    PVC's (premature ventricular contractions)    Gastroesophageal reflux disease without esophagitis    Obesity, Class II, BMI 35-39.9       CARDIOLOGY SPECIALTY COMMENTS  Patient has a history of stenting of the LAD and a large diagonal branch using bifurcation stents with crush technique. Following placement of the stents, it was unable to rewire the diagonal to dilate the or for this of the diagonal branch.  Due to complaints of chest discomfort, in 2010 another attempt was made unsuccessfully to dilate the origin of the diagonal branch. Patient has had chest discomfort off and on over the years although most recently it has been mild and infrequent. He also has an esophageal stricture and esophagitis which is confusing with his chest pain. He has undergone periodic esophageal dilatations. Other diagnoses include hypertension, gastritis, hiatal hernia, lung nodule, DVT for which he had been on Coumadin but it was stopped at the time of his visit on May 1, 2018. Hyperlipidemia and sleep apnea. 11/14/2019 [facility] nuclear stress test: Normal left ventricular systolic function, EF 11%. Normal tomographic perfusion series. No symptoms of angina pectoris or electrocardiographic changes of ischemia by Lexiscan stress    11/15/2019 [facility] echocardiogram: Normal left ventricular systolic function, EF 07%. Normal left ventricular chamber size. Right ventricular mild enlargement. Normal right ventricular function. Aortic valve not well visualized. No aortic stenosis. Normal left ventricular diastolic function. 01/16/2020 seen in [place] ED for chest discomfort and released  10/01/2020 seen in ED in [place] for left-sided chest pain, shoulder pain and palpitations. Patient ruled out released. 5/23/2023-5/26/2023 [facility]: Chest pain: Stable angina, history of DVT, chronic back pain, mixed hyperlipidemia, GERD without esophagitis, essential hypertension, obstructive sleep apnea, major depressive disorder, coronary artery disease    5/24/2023 echocardiogram: Normal LVEF 55%. Basilar anterior lateral wall hypokinetic. Normal diastolic function. Normal RV size and function. 5/26/2023 [facility]: Pharmacologic nuclear stress test: Not gated study, no EF. Normal LV perfusion. 05/25/2019 [facility] lipid profile: Total cholesterol 113, triglycerides 145, HDL 37, LDL calculated 53.  Non HDL cholesterol 76.   01/17/2020 [facility] lipid profile: Total cholesterol 133, triglycerides 165, HDL 55, LDL calculated 45, non HDL cholesterol 78.  5/24/2023 [facility] lipid profile: Cholesterol 125, triglycerides 176, HDL 34, LDL 56, non-HDL cholesterol 91    INTERVAL HISTORY:        Patient well-known to me was hospitalized at [facility] on 5/23/2023. In the morning he was just sitting at his desk when he developed a severe chest discomfort. When he arrived at [facility] DR RICKY RUBIO, he had severe hypertension and they had difficulty gaining control. He had losartan 25 mg daily added to his regimen and since then he has had no further difficulty. He had an echocardiogram and a nuclear stress test.  Both tests were normal.  He states that his blood pressure at home ranges from 115-125/72-82. His lower last lipid profile May 24, 2023 was excellent. Patient does have a request for clearance for upper endoscopy and a request to stop Plavix for 5 days    DISCUSSION/PLAN:          No contraindication to upper endoscopy. Recommend proceeding with procedure as planned  May stop Plavix 5 days prior to the endoscopy and resume the day after the endoscopy. Continue present management  Return in 1 year    Lab Studies:      Lab Results   Component Value Date    HGBA1C 6.5 (H) 01/17/2020        Results for orders placed or performed in visit on 11/16/23   POCT ECG    Narrative    Normal sinus rhythm. Normal EKG.          Current Outpatient Medications:     albuterol (PROVENTIL HFA,VENTOLIN HFA) 90 mcg/act inhaler, Ventolin HFA 90 mcg/actuation aerosol inhaler  INHALE 2 PUFFS EVERY 4 HOURS AS NEEDED, Disp: , Rfl:     ALPRAZolam (XANAX) 0.25 mg tablet, TAKE ONE TABLET BY MOUTH TWICE A DAY AS NEEDED FOR ANXIETY, Disp: , Rfl: 5    aspirin (ECOTRIN LOW STRENGTH) 81 mg EC tablet, Take 81 mg by mouth daily, Disp: , Rfl:     carvedilol (COREG) 12.5 mg tablet, Take 1 tablet (12.5 mg total) by mouth 2 (two) times a day with meals, Disp: 30 tablet, Rfl: 5 clopidogrel (PLAVIX) 75 mg tablet, clopidogrel 75 mg tablet  TAKE ONE TABLET BY MOUTH EVERY DAY, Disp: , Rfl:     folic acid (FOLVITE) 1 mg tablet, every 24 hours, Disp: , Rfl:     furosemide (LASIX) 40 mg tablet, Take 40 mg by mouth daily, Disp: , Rfl:     isosorbide mononitrate (IMDUR) 30 mg 24 hr tablet, every 24 hours, Disp: , Rfl:     losartan (COZAAR) 25 mg tablet, Take 25 mg by mouth daily, Disp: , Rfl:     NATURAL VITAMIN D-3 5000 units TABS, Take 1 tablet by mouth daily, Disp: , Rfl: 5    niacin (NIASPAN) 1000 MG CR tablet, Take 1,000 mg by mouth daily at bedtime, Disp: , Rfl:     oxyCODONE (ROXICODONE) 5 immediate release tablet, Take 5 mg by mouth every 4 (four) hours as needed for moderate pain, Disp: , Rfl:     pantoprazole (PROTONIX) 40 mg tablet, Take 40 mg by mouth daily, Disp: , Rfl: 5    potassium chloride (MICRO-K) 10 MEQ CR capsule, 10 mEq 2 (two) times a day, Disp: , Rfl:     ranolazine (RANEXA) 1000 MG SR tablet, Every 12 hours, Disp: , Rfl:     rosuvastatin (CRESTOR) 40 MG tablet, every 24 hours, Disp: , Rfl:     sertraline (ZOLOFT) 50 mg tablet, Take 50 mg by mouth daily, Disp: , Rfl: 5    CLONIDINE HCL PO, Take by mouth (Patient not taking: Reported on 11/16/2023), Disp: , Rfl:     magnesium chloride-calcium (MAG-SR PLUS CALCIUM)  mg, one pill twice a day (Patient not taking: Reported on 11/16/2023), Disp: , Rfl:     meclizine (ANTIVERT) 25 mg tablet, Take by mouth (Patient not taking: Reported on 9/20/2022), Disp: , Rfl:   Allergies   Allergen Reactions    Levofloxacin Abdominal Pain    Quinolones Abdominal Pain     Other reaction(s):  Other (See Comments), Stomach Pain  abd pain  Abdominal pain  Abdominal pain         Past Medical History:   Diagnosis Date    Coronary artery disease     Hiatal hernia     Hyperlipidemia     Lung nodule     Obstructive sleep apnea on CPAP     SLEEP APNEA WITH CPAP    Palpitations      Social History     Socioeconomic History    Marital status: /Civil [employer]     Spouse name: Not on file    Number of children: Not on file    Years of education: Not on file    Highest education level: Not on file   Occupational History    Not on file   Tobacco Use    Smoking status: Former     Packs/day: 2.00     Types: Cigarettes    Smokeless tobacco: Former    Tobacco comments:     TOBACCO USE   Substance and Sexual Activity    Alcohol use: Not on file    Drug use: Not on file    Sexual activity: Not on file   Other Topics Concern    Not on file   Social History Narrative    Not on file     Social Determinants of Health     Financial Resource Strain: Not on file   Food Insecurity: Not on file   Transportation Needs: Not on file   Physical Activity: Not on file   Stress: Not on file   Social Connections: Not on file   Intimate Partner Violence: Not on file   Housing Stability: Not on file      Family History   Problem Relation Age of Onset    Heart failure Mother         COD    Hypertension Mother         COD    Diabetes Mother         DM; COD    Heart failure Father         COD    Coronary artery disease Father         COD    Hypertension Father         COD    Heart attack Father         MI'S; COD    Hypertension Brother      Past Surgical History:   Procedure Laterality Date    BACK SURGERY  2007    CHOLECYSTECTOMY  1999    CORONARY ANGIOPLASTY  2010    FAILED PTCA OF THE DIAGONAL BRANCH    ESOPHAGEAL DILATION      X4    [facility] [address]  [facility]    XIENCE STENT OF THE LAD & DIAGONAL    LITHOTRIPSY      NISSEN FUNDOPLICATION  3282    SINUS SURGERY         PREVIOUS WEIGHTS:   Wt Readings from Last 10 Encounters:   11/16/23 109 kg (241 lb 3.2 oz)   09/20/22 110 kg (242 lb 3.2 oz)   04/14/21 109 kg (241 lb 6.4 oz)   08/26/20 104 kg (229 lb)   07/11/19 118 kg (259 lb 3.2 oz)        Review of Systems:  Review of Systems   Constitutional:  Negative for activity change.    Respiratory:  Negative for cough, choking, chest tightness, shortness of breath, wheezing and stridor. Cardiovascular:  Negative for chest pain, palpitations and leg swelling. Musculoskeletal:  Negative for gait problem. Skin:  Negative for color change. Neurological:  Negative for dizziness, tremors, syncope, weakness, light-headedness and headaches. Psychiatric/Behavioral:  Negative for agitation and confusion. Physical Exam:  /78 (BP Location: Right arm, Patient Position: Sitting, Cuff Size: Large)   Pulse 67   Wt 109 kg (241 lb 3.2 oz)   BMI 35.62 kg/m²     Physical Exam  Vitals reviewed. Constitutional:       General: He is not in acute distress. Appearance: He is well-developed. HENT:      Head: Normocephalic and atraumatic. Neck:      Thyroid: No thyromegaly. Vascular: No carotid bruit or JVD. Trachea: No tracheal deviation. Cardiovascular:      Rate and Rhythm: Normal rate and regular rhythm. Pulses: Normal pulses. Heart sounds: Normal heart sounds. No murmur heard. No friction rub. No gallop. Pulmonary:      Effort: Pulmonary effort is normal. No respiratory distress. Breath sounds: Normal breath sounds. No wheezing, rhonchi or rales. Chest:      Chest wall: No tenderness. Musculoskeletal:      Cervical back: Normal range of motion and neck supple. Right lower leg: No edema. Left lower leg: No edema. Skin:     General: Skin is warm and dry. Neurological:      General: No focal deficit present. Mental Status: He is alert and oriented to person, place, and time. Psychiatric:         Mood and Affect: Mood normal.         Behavior: Behavior normal.         Thought Content: Thought content normal.         Judgment: Judgment normal.       ======================================================  Imaging:   I have personally reviewed pertinent reports. I spent 30 minutes on the patient's office visit. This time was spent on the day of the visit.  I had direct contact with the patient in the office on the day of the visit. Greater than 50% of the total time was spent obtaining a history, examining patient, answering all patient questions, arranging and discussing plan of care with patient as well as directly providing instructions. Additional time then spent on orders and office chart. Portions of the record may have been created with voice recognition software. Occasional wrong word or "sound a like" substitutions may have occurred due to the inherent limitations of voice recognition software. Read the chart carefully and recognize, using context, where substitutions have occurred.     SIGNATURES:   Balaji Michel MD

## 2025-03-14 PROBLEM — Z01.810 PREOPERATIVE CARDIOVASCULAR EXAMINATION: Status: ACTIVE | Noted: 2025-03-14

## 2025-03-14 NOTE — PROGRESS NOTES
Leno Galloway  1960  [id number]  CARDIOVASC PHYSICIAN  [address] [facility] [address]  [phone]  [phone]    1. Coronary artery disease involving native coronary artery of native heart without angina pectoris        2. Essential hypertension        3. Kissing stent the LAD the large diagonal bifurcation performed in 2009         4. Mixed hyperlipidemia        5. Preoperative cardiovascular examination          Assessment/Plan  TODAY (03/18/25):   He is stable from a cardiac perspective. BP stable on exam and does not have anginal symptoms. Euvolemic one exam.   He is considerate moderate risk for colonoscopy with his cardiac cardiac history and comorbidities.   Okay to hold Plavix 5 days prior to colonoscopy.   RTO 06/04/25 with Dr. Sierra, former patient of Dr. Wise's.   Preoperative risk assessment  - procedure planned: colonoscopy   - cardiac diagnosis: coronary artery disease, HTN, HLD  - comorbidities: KATHLEEN, obesity   - METS: > 4 METS   - Social history: former smoker quit in the 90's, denies alcohol and drug use  - medications need to hold: Plavix   - cardiac risk: moderate   Coronary artery disease   - NM stress 05/26/23: negative for ischemia  - s/p PCI/stenting to the LAD and large diagonal branch using bifurcation stents with crush technique in 2009  - current rx: ASA 81 mg daily, Plavix 75 mg daily, and imdur 30 mg daily, ranexa 1000 mg BID  Hypertension  - TTE 05/24/23: LVEF 55% with basal anterolateral wall hypokinesis, no significant valvular abnormalities   - current rx: coreg 12.5 mg BID, imdur 30 mg daily, losartan 25 mg daily   Hyperlipidemia   - lipid panel 10/29/24: cholesterol 143, triglycerides 129, HDL 45, LDL 57  - current rx: rosuvastatin 40 mg daily   Chronic DVT- previously on warfarin, stopped in 2018  Prediabetes- A1c 5.9 on 10/29/24  GERD     Interval History:   This is a 64 y/o male with a PMH of CAD s/p PCI/stenting to the LAD in 2009, HTN, HLD, prediabetes,  and GERD who is presenting today for pre-operative clearance for a colonoscopy. He was last seen 11/16/23 by Dr. Wise. At this visit, he was doing overall stable and had no cardiac complaints.    Pt states he has been doing well since he last saw Dr. Wise. He takes nitro once every few months. He is able to walk at least 2 blocks and goes up and down the steps. Admits that exercise is limited due to his back issues. Has had 3 surgeries and been through PT. He is taking all his cardiac medications as directed. He is trying to lose weight and lost about 15 lbs so far. Does not use any assisted devices for ambulation. He is a former smoker and quit in the 90's. Denies alcohol and drug use     Past Medical History:   Diagnosis Date    Coronary artery disease     Hiatal hernia     Hyperlipidemia     Lung nodule     Obstructive sleep apnea on CPAP     SLEEP APNEA WITH CPAP    Palpitations      Social History     Socioeconomic History    Marital status: /Civil Union     Spouse name: Not on file    Number of children: Not on file    Years of education: Not on file    Highest education level: Not on file   Occupational History    Not on file   Tobacco Use    Smoking status: Former     Current packs/day: 2.00     Types: Cigarettes    Smokeless tobacco: Former    Tobacco comments:     TOBACCO USE   Substance and Sexual Activity    Alcohol use: Not on file    Drug use: Not on file    Sexual activity: Not on file   Other Topics Concern    Not on file   Social History Narrative    Not on file     Social Drivers of Health     Financial Resource Strain: Low Risk  (10/29/2024)    Received from [employer]    Financial Resource Strain     Do you have any trouble paying for your medications, or do you think you might in the future?: No     Does your family have trouble paying for medicine? (Household - for ages 0-17 years): Not on file   Food Insecurity: No Food Insecurity (10/29/2024)    Received from [employer]    Hunger  Vital Sign     Worried About Running Out of Food in the Last Year: Never true     Ran Out of Food in the Last Year: Never true   Transportation Needs: No Transportation Needs (10/29/2024)    Received from [employer]    Transportation Needs     Do you have trouble getting a ride to medical visits or work? (Adult - for ages 18 years and over): Not on file     Does your family have a hard time getting a ride to doctors’ visits? (Household - for ages 0-17 years): Not on file     Has lack of transportation kept you from medical appointments, meetings, work, or from getting things needed for daily living? Check all that apply.: No     Do you (or your family) have trouble finding or paying for a ride (transportation)? (Household - for ages 0-17 years): Not on file   Physical Activity: Not on file   Stress: Not on file   Social Connections: Socially Integrated (10/29/2024)    Received from [employer]    Social Connections     How often do you feel lonely or isolated from those around you?: Never   Intimate Partner Violence: Not At Risk (5/24/2023)    Received from [facility]    Humiliation, Afraid, Rape, and Kick questionnaire     Fear of Current or Ex-Partner: No     Emotionally Abused: No     Physically Abused: No     Sexually Abused: No   Housing Stability: Low Risk  (10/29/2024)    Received from [employer]    Housing Stability     Do you currently live in a shelter or have no steady place to sleep at night?: No     Do you think you are at risk of becoming homeless? (Adult - for ages 18 years and over): Not on file     Does your family worry about paying for your home or becoming homeless? (Household - for ages 0-17 years): Not on file     Are you homeless or worried that you might be in the future?: No     Are you (or your family) homeless or worried that you might be in the future? (Household - for ages 0-17 years): Not on file      Family History   Problem Relation Age of Onset    Heart failure Mother          COD    Hypertension Mother         COD    Diabetes Mother         DM; COD    Heart failure Father         COD    Coronary artery disease Father         COD    Hypertension Father         COD    Heart attack Father         MI'S; COD    Hypertension Brother      Past Surgical History:   Procedure Laterality Date    BACK SURGERY  2007    CHOLECYSTECTOMY  1999    CORONARY ANGIOPLASTY  2010    FAILED PTCA OF THE DIAGONAL BRANCH    ESOPHAGEAL DILATION      X4    INSERTION STENT ARTERY  2010    XIENCE STENT OF THE LAD & DIAGONAL    LITHOTRIPSY      NISSEN FUNDOPLICATION  2003    SINUS SURGERY         Current Outpatient Medications:     albuterol (PROVENTIL HFA,VENTOLIN HFA) 90 mcg/act inhaler, Ventolin HFA 90 mcg/actuation aerosol inhaler  INHALE 2 PUFFS EVERY 4 HOURS AS NEEDED, Disp: , Rfl:     aspirin (ECOTRIN LOW STRENGTH) 81 mg EC tablet, Take 81 mg by mouth daily, Disp: , Rfl:     carvedilol (COREG) 12.5 mg tablet, Take 1 tablet (12.5 mg total) by mouth 2 (two) times a day with meals, Disp: 30 tablet, Rfl: 5    clopidogrel (PLAVIX) 75 mg tablet, clopidogrel 75 mg tablet  TAKE ONE TABLET BY MOUTH EVERY DAY, Disp: , Rfl:     fluticasone (FLONASE) 50 mcg/act nasal spray, 2 sprays into each nostril daily, Disp: , Rfl:     folic acid (FOLVITE) 1 mg tablet, every 24 hours, Disp: , Rfl:     furosemide (LASIX) 40 mg tablet, Take 40 mg by mouth daily, Disp: , Rfl:     isosorbide mononitrate (IMDUR) 30 mg 24 hr tablet, every 24 hours, Disp: , Rfl:     losartan (COZAAR) 25 mg tablet, Take 25 mg by mouth daily, Disp: , Rfl:     NATURAL VITAMIN D-3 5000 units TABS, Take 1 tablet by mouth daily, Disp: , Rfl: 5    niacin (NIASPAN) 1000 MG CR tablet, Take 1,000 mg by mouth daily at bedtime, Disp: , Rfl:     nitroglycerin (NITROSTAT) 0.4 mg SL tablet, PLACE 1 TABLET UNDER THE TONGUE EVERY 5 MINUTES AS NEEDED FOR CHEST PAIN TO A MAX OF 3. CALL EMS, Disp: , Rfl:     oxyCODONE (ROXICODONE) 5 immediate release tablet, Take 5 mg by  mouth every 4 (four) hours as needed for moderate pain, Disp: , Rfl:     pantoprazole (PROTONIX) 40 mg tablet, Take 40 mg by mouth daily, Disp: , Rfl: 5    potassium chloride (MICRO-K) 10 MEQ CR capsule, 10 mEq 2 (two) times a day, Disp: , Rfl:     ranolazine (RANEXA) 1000 MG SR tablet, Every 12 hours, Disp: , Rfl:     rosuvastatin (CRESTOR) 40 MG tablet, every 24 hours, Disp: , Rfl:     sertraline (ZOLOFT) 50 mg tablet, Take 50 mg by mouth daily, Disp: , Rfl: 5    vitamin B-12 (VITAMIN B-12) 1,000 mcg tablet, Take 1,000 mcg by mouth daily, Disp: , Rfl:     ALPRAZolam (XANAX) 0.25 mg tablet, TAKE ONE TABLET BY MOUTH TWICE A DAY AS NEEDED FOR ANXIETY (Patient not taking: Reported on 3/18/2025), Disp: , Rfl: 5  Allergies   Allergen Reactions    Levofloxacin Abdominal Pain    Quinolones Abdominal Pain     Other reaction(s): Other (See Comments), Stomach Pain  abd pain  Abdominal pain  Abdominal pain         Labs:     Chemistry        Component Value Date/Time    K 3.7 01/27/2025 1725     01/27/2025 1725    CO2 29 01/27/2025 1725    BUN 16 01/27/2025 1725    CREATININE 1.2 01/27/2025 1725        Component Value Date/Time    CALCIUM 9.2 01/27/2025 1725    ALKPHOS 49 01/27/2025 1725    AST 15 01/27/2025 1725    ALT 18 01/27/2025 1725        Imaging: No results found.    ECG:    Normal sinus rhythm, HR 73 RSR' pattern in V1, nonspecific T wave abnormality, QTc 473      Review of Systems   Constitutional: Positive for weight loss (intentional). Negative for chills, diaphoresis, fever, malaise/fatigue and weight gain.   Cardiovascular:  Positive for chest pain (rare). Negative for dyspnea on exertion, irregular heartbeat, leg swelling, near-syncope, orthopnea, palpitations, paroxysmal nocturnal dyspnea and syncope.   Respiratory:  Negative for cough, shortness of breath, sleep disturbances due to breathing, snoring and wheezing.    Skin:  Negative for rash.   Gastrointestinal:  Negative for bloating, abdominal pain and  nausea.   Neurological:  Negative for dizziness and light-headedness.       Vitals:    03/18/25 1248   BP: 110/60   Pulse: 73     Vitals:    03/18/25 1248   Weight: 107 kg (236 lb)         Body mass index is 34.85 kg/m².    Physical Exam  Vitals and nursing note reviewed.   Constitutional:       General: He is not in acute distress.     Appearance: Normal appearance. He is obese. He is not ill-appearing.   HENT:      Head: Normocephalic and atraumatic.      Nose: Nose normal.      Mouth/Throat:      Mouth: Mucous membranes are moist.   Eyes:      Conjunctiva/sclera: Conjunctivae normal.   Cardiovascular:      Rate and Rhythm: Normal rate and regular rhythm.      Pulses:           Radial pulses are 2+ on the right side and 2+ on the left side.      Heart sounds: S1 normal and S2 normal. No murmur heard.  Pulmonary:      Effort: Pulmonary effort is normal. No respiratory distress.      Breath sounds: Normal breath sounds. No stridor. No wheezing, rhonchi or rales.   Abdominal:      General: There is no distension.   Musculoskeletal:      Cervical back: Neck supple.      Right lower leg: No edema.      Left lower leg: No edema.   Skin:     General: Skin is warm.      Capillary Refill: Capillary refill takes less than 2 seconds.   Neurological:      General: No focal deficit present.      Mental Status: He is alert.   Psychiatric:         Thought Content: Thought content normal.

## 2025-03-18 ENCOUNTER — OFFICE VISIT (OUTPATIENT)
Dept: CARDIOLOGY CLINIC | Facility: CLINIC | Age: 65
End: 2025-03-18
Payer: COMMERCIAL

## 2025-03-18 VITALS
DIASTOLIC BLOOD PRESSURE: 60 MMHG | BODY MASS INDEX: 34.85 KG/M2 | WEIGHT: 236 LBS | SYSTOLIC BLOOD PRESSURE: 110 MMHG | HEART RATE: 73 BPM

## 2025-03-18 DIAGNOSIS — I25.10 CORONARY ARTERY DISEASE INVOLVING NATIVE CORONARY ARTERY OF NATIVE HEART WITHOUT ANGINA PECTORIS: Primary | ICD-10-CM

## 2025-03-18 DIAGNOSIS — E78.2 MIXED HYPERLIPIDEMIA: ICD-10-CM

## 2025-03-18 DIAGNOSIS — I10 ESSENTIAL HYPERTENSION: ICD-10-CM

## 2025-03-18 DIAGNOSIS — Z95.5 HISTORY OF HEART ARTERY STENT: ICD-10-CM

## 2025-03-18 DIAGNOSIS — Z01.810 PREOPERATIVE CARDIOVASCULAR EXAMINATION: ICD-10-CM

## 2025-03-18 PROCEDURE — 99214 OFFICE O/P EST MOD 30 MIN: CPT

## 2025-03-18 PROCEDURE — 93000 ELECTROCARDIOGRAM COMPLETE: CPT

## 2025-03-18 RX ORDER — FLUTICASONE PROPIONATE 50 MCG
2 SPRAY, SUSPENSION (ML) NASAL DAILY
COMMUNITY
Start: 2025-03-12

## 2025-03-18 RX ORDER — NITROGLYCERIN 0.4 MG/1
TABLET SUBLINGUAL
COMMUNITY
Start: 2025-02-12

## 2025-03-18 RX ORDER — LANOLIN ALCOHOL/MO/W.PET/CERES
1000 CREAM (GRAM) TOPICAL DAILY
COMMUNITY
Start: 2025-02-12

## 2025-03-26 ENCOUNTER — TELEPHONE (OUTPATIENT)
Dept: CARDIOLOGY CLINIC | Facility: CLINIC | Age: 65
End: 2025-03-26

## 2025-03-26 NOTE — TELEPHONE ENCOUNTER
Caller: Lilly achraya/[facility]     Doctor/Office: Dr Wise/Marlene    #: [phone]      What needs to be faxed: most recent EKG tracings on 3/18/25 for colonoscopy being done on 4/1/25     ATTN to: Lilly     Fax#:  [phone]

## 2025-07-25 ENCOUNTER — TELEPHONE (OUTPATIENT)
Dept: CARDIOLOGY CLINIC | Facility: CLINIC | Age: 65
End: 2025-07-25

## 2025-07-31 ENCOUNTER — TELEPHONE (OUTPATIENT)
Dept: CARDIOLOGY CLINIC | Facility: CLINIC | Age: 65
End: 2025-07-31

## 2025-08-01 ENCOUNTER — TELEPHONE (OUTPATIENT)
Dept: CARDIOLOGY CLINIC | Facility: CLINIC | Age: 65
End: 2025-08-01